# Patient Record
Sex: FEMALE | Race: WHITE | NOT HISPANIC OR LATINO | Employment: OTHER | URBAN - METROPOLITAN AREA
[De-identification: names, ages, dates, MRNs, and addresses within clinical notes are randomized per-mention and may not be internally consistent; named-entity substitution may affect disease eponyms.]

---

## 2019-10-31 PROBLEM — N81.2 UTEROVAGINAL PROLAPSE, INCOMPLETE: Status: ACTIVE | Noted: 2019-10-31

## 2019-10-31 PROBLEM — N95.0 PMB (POSTMENOPAUSAL BLEEDING): Status: ACTIVE | Noted: 2019-10-31

## 2019-10-31 PROBLEM — D28.0 BENIGN NEOPLASM OF VULVA: Status: ACTIVE | Noted: 2019-10-31

## 2019-12-30 PROBLEM — T83.711A EROSION OF IMPLANTED VAGINAL MESH AND PROSTHETIC MATERIALS: Status: ACTIVE | Noted: 2019-12-30

## 2019-12-30 PROBLEM — L92.9 ABNORMAL GRANULATION TISSUE: Status: ACTIVE | Noted: 2019-12-30

## 2020-02-20 PROBLEM — M51.36 DDD (DEGENERATIVE DISC DISEASE), LUMBAR: Status: ACTIVE | Noted: 2020-02-20

## 2020-02-20 PROBLEM — M51.369 DDD (DEGENERATIVE DISC DISEASE), LUMBAR: Status: ACTIVE | Noted: 2020-02-20

## 2024-06-18 ENCOUNTER — TELEPHONE (OUTPATIENT)
Dept: RHEUMATOLOGY | Facility: CLINIC | Age: 69
End: 2024-06-18

## 2024-06-18 NOTE — TELEPHONE ENCOUNTER
----- Message from Shanon Sheridan sent at 6/18/2024  9:23 AM CDT -----  Type:  Sooner Appointment Request    Caller is requesting a sooner appointment.  Caller declined first available appointment listed below.  Caller will not accept being placed on the waitlist and is requesting a message be sent to doctor.    Name of Caller:  pt  When is the first available appointment?  unk  Symptoms:    Would the patient rather a call back or a response via [facility]? call  Best Call Back Number:  [phone]  Additional Information:  Pt was referred to make an appt with rheumatology  Please call to advise.  She is willing to see whoever is available first

## 2024-08-21 ENCOUNTER — PATIENT MESSAGE (OUTPATIENT)
Dept: NEUROLOGY | Facility: CLINIC | Age: 69
End: 2024-08-21
Payer: COMMERCIAL

## 2024-08-21 ENCOUNTER — TELEPHONE (OUTPATIENT)
Dept: NEUROLOGY | Facility: CLINIC | Age: 69
End: 2024-08-21
Payer: COMMERCIAL

## 2024-08-21 NOTE — TELEPHONE ENCOUNTER
Spoke with the pt, referral for dysphasia (sound of pseudobulbar effect). Pt is scheduled for salivary gland bx next week, is suspected to have sjogrens although labs have been negative. The pt is scheduled with you Sept 3rd for eval, should she postpone the bx until after your evaluation, please advise.

## 2024-08-22 ENCOUNTER — OFFICE VISIT (OUTPATIENT)
Dept: NEUROLOGY | Facility: CLINIC | Age: 69
End: 2024-08-22
Payer: COMMERCIAL

## 2024-08-22 VITALS
BODY MASS INDEX: 25.79 KG/M2 | DIASTOLIC BLOOD PRESSURE: 67 MMHG | SYSTOLIC BLOOD PRESSURE: 124 MMHG | HEART RATE: 67 BPM | RESPIRATION RATE: 16 BRPM | WEIGHT: 145.63 LBS

## 2024-08-22 DIAGNOSIS — R47.1 DYSARTHRIA: Primary | ICD-10-CM

## 2024-08-22 DIAGNOSIS — R13.10 DYSPHAGIA, UNSPECIFIED TYPE: ICD-10-CM

## 2024-08-22 PROCEDURE — 99999 PR PBB SHADOW E&M-EST. PATIENT-LVL III: CPT | Mod: PBBFAC,,, | Performed by: PSYCHIATRY & NEUROLOGY

## 2024-08-22 PROCEDURE — 3044F HG A1C LEVEL LT 7.0%: CPT | Mod: CPTII,S$GLB,, | Performed by: PSYCHIATRY & NEUROLOGY

## 2024-08-22 PROCEDURE — 4010F ACE/ARB THERAPY RXD/TAKEN: CPT | Mod: CPTII,S$GLB,, | Performed by: PSYCHIATRY & NEUROLOGY

## 2024-08-22 PROCEDURE — 99205 OFFICE O/P NEW HI 60 MIN: CPT | Mod: S$GLB,,, | Performed by: PSYCHIATRY & NEUROLOGY

## 2024-08-22 PROCEDURE — 3288F FALL RISK ASSESSMENT DOCD: CPT | Mod: CPTII,S$GLB,, | Performed by: PSYCHIATRY & NEUROLOGY

## 2024-08-22 PROCEDURE — 3008F BODY MASS INDEX DOCD: CPT | Mod: CPTII,S$GLB,, | Performed by: PSYCHIATRY & NEUROLOGY

## 2024-08-22 PROCEDURE — 3078F DIAST BP <80 MM HG: CPT | Mod: CPTII,S$GLB,, | Performed by: PSYCHIATRY & NEUROLOGY

## 2024-08-22 PROCEDURE — 1100F PTFALLS ASSESS-DOCD GE2>/YR: CPT | Mod: CPTII,S$GLB,, | Performed by: PSYCHIATRY & NEUROLOGY

## 2024-08-22 PROCEDURE — 3074F SYST BP LT 130 MM HG: CPT | Mod: CPTII,S$GLB,, | Performed by: PSYCHIATRY & NEUROLOGY

## 2024-08-22 PROCEDURE — G2211 COMPLEX E/M VISIT ADD ON: HCPCS | Mod: S$GLB,,, | Performed by: PSYCHIATRY & NEUROLOGY

## 2024-08-22 NOTE — PATIENT INSTRUCTIONS
Agree with cancelling the biopsy.  I agree with rheumatology that your symptoms are not consistent with dry mouth or Sjogren's Syndrome.    Will get labs today as a preliminary screen to start looking at muscle conditions.    Will get a follow up swallow evaluation.    Will request a copy of your EMG

## 2024-08-22 NOTE — PROGRESS NOTES
NEUROLOGY  Outpatient CONSULT  [facility]  [address] [facility] [address], [place] [address]  [phone] (office) / [phone] (fax)    Patient Name:  Nicci Shannon  :  1955  MR #:  [id number]  Acct #:  [id number]    Date of Neurology Consult: 2024  Name of Neurologist: Lena Virk D.O, ABPN, AOBNP, ABEM    Other Physicians:  Jasper Moreno MD (Primary Care Physician); Self, Kasey (Referring)      Chief Complaint: dysarthria      History of Present Illness (HPI):  Nicci Shannon is a 69 y.o. female referred for consultation regarding speech and swallowing changes.    She noticed she lost in her singing voice last .  She had to quit the choir in December because of this.  She had no vocal control when singing, wobbling of the voice.  She had no shortness of breath when singing.  Then she started to notice her tongue was becoming lazy and the quality of her speech changed.  She started speech therapy around 2024, but she isn't sure it has helped though she seemed to stabilize.  After being off ST for a couple months she wants to go back.    Slowly she started to notice issues with swallowing.  This seemed to get progressively worse as well.  She has to eat a mechanical diet, otherwise she feels things will get stuck.  She will get choked up sipping on water.  She is living on soup.  She has to be careful, even this can cause her to cough or have to clear her throat.  She sometimes feels it hits the back of her throat before she is ready for it.    She feels that her cough is pretty strong.      She had been following with ENT a few months prior to this and she picked up on the speech changes.  She ordered a neurology referral and speech therapy referral. She was seen by neurology at [facility].  They did an EMG and some blood work for Sjogren's.  She also had an MRI of her neck.  She was seen by rheumatology at [facility]. They did more blood work.  Overall  her workup thus far was fairly unremarkable.  Rheumatology suggested back to neurology, but was willing to do one other test.  She was referred back to ENT for a bx of the salivary gland.  She is scheduled for this next week at [facility].      She has no significant dry eye symptoms but does use eye drops here and there. She never has burning or excessive tearing.  She states she has excessive saliva at night, even during the day she has to wipe her saliva.  She also feels her left face droops some.  She feels her saliva is thick at times, but she does not feel like she has dry mouth.     She denies any weight loss.  She has not had shortness of breath.  She had a bad fall last year, but no falls lately. She has stumbled a few times, she attributes to not picking up her feet or feeling a little disequilibrium.  This is chronic, but seems more evident now.  She has no double vision or drooping eyelids.    There is no fluctuation in her symptoms.      She has no family history of any neurological conditions.     No preceding illness or infection.       Treatment to date:    N/A    Review of Systems:   General: Weight gain: No, Weight Loss: No, Fatigue: Yes,   Fever: No, Chills: No, Night Sweats: No, Insomnia: No, Excessive sleeping: No   Respiratory:  Cough: No, Shortness of Breath: No,   Wheezing: No, Excessive Snoring: Yes, Coughing up blood: No  Endocrine: Heat Intolerance: No, Cold Intolerance: No,   Excessive Thirst: No, Excessive Hunger: No,   Eyes:  Blurred Vision: No, Double Vision: No,   Light Sensitivity: No, Eye pain: No  Musculoskeletal: Muscle Aches/Pain: Yes, Joint Pain/Swelling: No, Muscle Cramps: No, Muscle Weakness: No, Neck Pain: Yes, Back Pain: Yes   Neurological: Difficulty Walking/Falls: No, Headache Migraine: Yes, Dizziness/Vertigo: Yes, Fainting: No, Difficulty with Speech: Yes, Weakness: No, Tingling/Numbness: No, Tremors: No, Memory Problems: Yes, Seizures: No, Difficulty Swallowing: Yes,  Altered Taste: No.  Cardiovascular: Chest Pain: No, Shortness of Breath: No,   Palpitations: No,  Gastrointestinal: Nausea/Vomiting: No, Constipation: No, Diarrhea: No, Bloody Stools: No   Psych/Cog:  Depression: No, Anxiety: No, Hallucinations: No, Problems Concentrating: Yes  : Frequent Urination: No, Incontinence: No, Urinary Infections: No, Blood of Urine: No,   ENT:Hearing Loss: Yes, Earache: No, Ringing in Ears: Yes,   Facial Pain: No, Chronic Congestion: No   Immune: Seasonal Allergies: No, Hives and/or Rashes: No  The remainder of the review of twelve body systems was reviewed and normal.    Past Medical, Surgical, Family & Social History:   Past Medical History:   Diagnosis Date    Bursitis     right hip    DDD (degenerative disc disease), cervical     DDD (degenerative disc disease), lumbar     Depression     General anesthetics causing adverse effect in therapeutic use     Hearing loss     Hypertension     Hypothyroidism     ROBERTO (obstructive sleep apnea)     does not use cpap    Shoulder pain     left    Thyroid disease      Past Surgical History:   Procedure Laterality Date    ABDOMINAL SACROCOLPOPEXY N/A 10/31/2019    Procedure: SACROCOLPOPEXY, ABDOMINAL;  Surgeon: Reagan Maldonado MD;  Location: [facility] OR;  Service: OB/GYN;  Laterality: N/A;    CERVICAL FUSION      CYSTOSCOPY N/A 10/31/2019    Procedure: CYSTOSCOPY;  Surgeon: Reagan Maldonado MD;  Location: [facility] OR;  Service: OB/GYN;  Laterality: N/A;    PARTIAL VULVECTOMY Bilateral 10/31/2019    Procedure: VULVECTOMY, PARTIAL - Labial Mass Resection;  Surgeon: Reagan Maldonado MD;  Location: [facility] OR;  Service: OB/GYN;  Laterality: Bilateral;    REVISION OF VAGINAL CUFF N/A 12/30/2019    Procedure: REVISION, VAGINAL CUFF;  Surgeon: Reagan Maldonado MD;  Location: [facility] OR;  Service: OB/GYN;  Laterality: N/A;    SURGICAL PROCEDURE FOR STRESS INCONTINENCE USING TENSION FREE VAGINAL TAPE N/A 10/31/2019    Procedure: SURGICAL PROCEDURE, USING  TENSION FREE VAGINAL TAPE, FOR STRESS INCONTINENCE;  Surgeon: Reagan Maldonado MD;  Location: [facility] OR;  Service: OB/GYN;  Laterality: N/A;    TONSILLECTOMY      TOTAL ABDOMINAL HYSTERECTOMY W/ BILATERAL SALPINGOOPHORECTOMY Bilateral 10/31/2019    Procedure: HYSTERECTOMY, TOTAL, ABDOMINAL, WITH BILATERAL SALPINGO-OOPHORECTOMY;  Surgeon: Reagan Maldonado MD;  Location: [facility] OR;  Service: OB/GYN;  Laterality: Bilateral;    VAGINAL DELIVERY      x2    VEIN STRIPPING        Family History   Problem Relation Name Age of Onset    Heart disease Mother      Stroke Father       Alcohol use:  reports that she does not currently use alcohol.   (Of note, 0.6 oz = 1 beer or 6 oz = 10 beers).  Tobacco use:  reports that she quit smoking about 48 years ago. Her smoking use included cigarettes. She started smoking about 52 years ago. She has a 2 pack-year smoking history. She has never used smokeless tobacco.  Street drug use:  reports no history of drug use.  Allergies: Patient has no known allergies..    Home Medications:     Current Outpatient Medications:     aspirin (ECOTRIN) 81 MG EC tablet, Take 81 mg by mouth once daily., Disp: , Rfl:     cholecalciferol, vitamin D3, 1,250 mcg (50,000 unit) capsule, Take 50,000 Units by mouth twice a week., Disp: , Rfl:     estradioL (ESTRACE) 2 MG tablet, Take 2 mg by mouth once daily., Disp: , Rfl:     FLUoxetine 20 MG capsule, Take 1 capsule (20 mg total) by mouth once daily., Disp: 30 capsule, Rfl: 1    gabapentin (NEURONTIN) 300 MG capsule, Take 1 capsule by mouth nightly., Disp: , Rfl:     losartan (COZAAR) 25 MG tablet, Take 1 tablet (25 mg total) by mouth every evening., Disp: 90 tablet, Rfl: 3    pantoprazole (PROTONIX) 40 MG tablet, Take 1 tablet (40 mg total) by mouth once daily., Disp: 30 tablet, Rfl: 11    pravastatin (PRAVACHOL) 40 MG tablet, Take 1 tablet (40 mg total) by mouth once daily., Disp: 90 tablet, Rfl: 3    Physical Examination:  /67 (BP Location: Left  arm, Patient Position: Sitting, BP Method: Medium (Manual))   Pulse 67   Resp 16   Wt 66 kg (145 lb 9.8 oz)   BMI 25.79 kg/m²     GENERAL:  General appearance: Well, non-toxic appearing.  No apparent distress.  Extremities: normal.    MENTAL STATUS:  Alertness, attention span & concentration: normal.  Language: normal.  Orientation to self, place & time:  normal.  Memory, recent & remote: normal.  Fund of knowledge: normal.    SPEECH:  Dysarthric and fluent.  Follows complex commands.    CRANIAL NERVES:  Cranial Nerves II-XII were examined.  II - Visual fields: normal.  III, IV, VI: PERRL, EOMI, No ptosis, No nystagmus.  V - Facial sensation: normal.  VII - Face symmetry & mobility: normal.  VIII - Hearing: normal.  IX, X - Palate: mobile & midline.  XI - Shoulder shrug: normal.  XII - Tongue protrusion: normal.    GROSS MOTOR:  Gait & station: normal.  Tone: normal.  Abnormal movements: none.  Finger-nose & Heel-knee-shin: normal.  Rapid alternating movements & drift: normal.    MUSCLE STRENGTH:     Fascics Atrophy RIGHT    LEFT Atrophy Fascics     5 Neck Ext. 5       5 Neck Flex 5       4 Deltoids 4       5 Sh.Ext.Rot. 5       5 Sh.Int.Rot. 5       5 Biceps 5       5 Triceps 5       5 Forearm.Pr. 5       5 Wrist Ext. 5       5 Wrist Flex 5       5 Finger Ext. 5       5 Finger Flex 5       5 FPL 5       5 Inteross. 5                         4 Iliopsoas 4       5 Hip Abduct 5       5 Hip Adduct 5       5 Quads 5       5 Hams 5       5 Dorsiflex 5       5 Plantar Flex 5       5 Ankle Elkin 5       5 Ankle Invert 5       5 Toe Ext. 5       5 Toe Flex 5                         REFLEXES:    RIGHT Reflex   LEFT   3 Biceps 3   3 Brachiorad. 3   3 Triceps 3   - Pectoralis -   - Jaw Jerk -   - Mtz's -        2+ Patellar 2+   2+ Ankle 2+   - Suprapatellar -              PLANTAR        SENSORY:  Light touch: Normal throughout.  Sharp touch: Normal throughout.  Vibration: Normal throughout.      Diagnostic Data  Reviewed:   Laryngoscopy 11/1/23 with Dr. Lewis at [facility]  Laryngoscopy flexible fiberoptic (NPL exam)  Following the topical application of nasal decongestant and anesthetic spray to the nasal cavity, flexible scope was passed via the nasal cavity.  Findings:   NP: adenoids surgically absent  Hypopharynx: normal  BOT: normal  Larynx:   Epiglottis - normal  Aryepiglottic folds - normal  Arytenoids - normal  Interarytenoid area - normal  False vocal cords - normal  True vocal cords - normal  Patient tolerated the procedure well without difficulty    Component      Latest Ref Rng 8/1/2024   WBC      3.90 - 12.70 K/uL 4.90    RBC      4.00 - 5.40 M/uL 4.12    Hemoglobin      12.0 - 16.0 g/dL 11.7 (L)    Hematocrit      37.0 - 48.5 % 36.3 (L)    MCV      82 - 98 fL 88    MCH      27.0 - 31.0 pg 28.4    MCHC      32.0 - 36.0 g/dL 32.2    RDW      11.5 - 14.5 % 12.7    Platelet Count      150 - 450 K/uL 338    MPV      9.2 - 12.9 fL 10.3    Immature Granulocytes      0.0 - 0.5 % 0.4    Gran # (ANC)      1.8 - 7.7 K/uL 2.1    Immature Grans (Abs)      0.00 - 0.04 K/uL 0.02    Lymph #      1.0 - 4.8 K/uL 2.1    Mono #      0.3 - 1.0 K/uL 0.4    Eos #      0.0 - 0.5 K/uL 0.2    Baso #      0.00 - 0.20 K/uL 0.05    nRBC      0 /100 WBC 0    Gran %      38.0 - 73.0 % 43.2    Lymph %      18.0 - 48.0 % 42.7    Mono %      4.0 - 15.0 % 8.4    Eos %      0.0 - 8.0 % 4.3    Basophil %      0.0 - 1.9 % 1.0    Differential Method Automated    Sodium      136 - 145 mmol/L 139    Potassium      3.5 - 5.1 mmol/L 4.3    Chloride      95 - 110 mmol/L 107    CO2      22 - 31 mmol/L 26    Glucose      70 - 110 mg/dL 91    BUN      7 - 18 mg/dL 13    Creatinine      0.50 - 1.40 mg/dL 0.86    Calcium      8.4 - 10.2 mg/dL 8.8    PROTEIN TOTAL      6.0 - 8.4 g/dL 6.5    Albumin      3.5 - 5.2 g/dL 3.7    BILIRUBIN TOTAL      0.2 - 1.3 mg/dL 0.6    ALP      38 - 145 U/L 61    AST      14 - 36 U/L 24    ALT      0 - 35 U/L 16    Anion  Gap      5 - 12 mmol/L 6    eGFR      >60 mL/min/1.73 m^2 >60    SSA Antibody      0 - 40 AU/mL 4    SSA 60 (Ro) SARINA Antibody      0 - 40 AU/mL 1    Thyroperoxidase Antibodies      0.0 - 9.0 IU/mL 0.5    Thyroglobulin Ab Screen      0.0 - 4.0 IU/mL 4.5 (H)    Anti-Centromere Antibody      0 - 40 AU/mL 0    SCL-70 Antibody      0 - 40 AU/mL 1    TSH      0.400 - 4.000 uIU/mL 2.420    Free T4      0.78 - 2.19 ng/dL 1.00    SSB Antibody      0 - 40 AU/mL 0    IgG 4      1 - 123 mg/dL 73    Hep B S Ab      IU/L <3.10    T-SPOT TB Screening Test See result image under hyperlink    HCV Ab Screen w/Reflex Negative    Hepatitis B Core Ab Total      Negative  Negative       Component      Latest Ref Rng 3/27/2024 6/7/2024   SSA Antibody      0 - 40 AU/mL  3    SSA 60 (Ro) SARINA Antibody      0 - 40 AU/mL  1    AChR Blocking Abs, Serum      0 - 26 % 15     AChR Binding Ab, Serum      0.0 - 0.4 nmol/L 0.0     MuSK Antibody Test      <1:10  <1:10     LEANNA Screen      None Detected   None Detected    CRP      0.00 - 0.90 mg/dL  <0.50    Sed Rate      0 - 29 mm/Hr  3        MBSS 11/15/23 at [facility]   1.  Laryngeal penetration observed with intermediate liquid consistency only.    2.  No fluoroscopic evidence of aspiration with any presented consistency.   3. Barium tablet passes through pharynx and cervical esophagus without delay.   4. Refer to speech pathology note for additional detail and nutritional recommendation.     Esophagram 11/17/23 at [facility]  1.  Esophageal dysmotility.    2.  Small hiatal hernia.   3. Moderate gastroesophageal reflux disease.         Assessment and Plan:  Nicci Shannon is a 69 y.o. right handed woman with progressive dysarthria and dysphagia, mild proximal muscle weakness and brisk reflexes.  The bulk of her symptoms raise concern for progressive bulbar palsy, but the reflex changes in the upper limbs could be indicative of a more diffuse type of motor neuron disease such as ALS.  We reviewed these concerns  today and have discussed testing.  They understand there is no testing for motor neuron disease, it is a diagnosis of exclusion.  There are however criteria that must be met to be more confident of the diagnosis.  At this point her physical exam does not meet that physical exam criteria.    As per her rheumatologist suspicion I do not believe this is related to Sjogren's disease at all.  I do not feel she will require a biopsy to make that diagnosis.    We will get labs today as a preliminary screen looking at muscle conditions.  She will get a follow-up swallow evaluation, her swallow study last year was normal.  It is clear she is developing some issues related to swallowing and may need some advised him that.  Going to request a copy of her previous EMG however suspect we will be repeating this.  She is in agreement with this plan I will see her back shortly after testing is completed to review the results.    Information on patient AVS:  Agree with cancelling the biopsy.  I agree with rheumatology that your symptoms are not consistent with dry mouth or Sjogren's Syndrome.  Will get labs today as a preliminary screen to start looking at muscle conditions.    Will get a follow up swallow evaluation.  Will request a copy of your EMG       Visit today is associated with current or anticipated ongoing medical care related to this patient's single serious condition/complex condition (dysarthria/dysphagia). Plan to followup in 1 months for ongoing management.     Important to note, also  has a past medical history of Bursitis, DDD (degenerative disc disease), cervical, DDD (degenerative disc disease), lumbar, Depression, General anesthetics causing adverse effect in therapeutic use, Hearing loss, Hypertension, Hypothyroidism, ROBERTO (obstructive sleep apnea), Shoulder pain, and Thyroid disease.    Time Spent: I spent a total of 74 minutes on the day of the visit.This includes face to face time and non-face to face time  preparing to see the patient (eg, review of tests), Obtaining and/or reviewing separately obtained history, Documenting clinical information in the electronic or other health record, Independently interpreting resultsand communicating results to the patient/family/caregiver, or Care coordination.        Lena Virk D.O, ABPN, AOBNP, ABEM

## 2024-08-26 ENCOUNTER — LAB VISIT (OUTPATIENT)
Dept: LAB | Facility: HOSPITAL | Age: 69
End: 2024-08-26
Attending: PSYCHIATRY & NEUROLOGY
Payer: COMMERCIAL

## 2024-08-26 DIAGNOSIS — R47.1 DYSARTHRIA: ICD-10-CM

## 2024-08-26 DIAGNOSIS — R13.10 DYSPHAGIA, UNSPECIFIED TYPE: ICD-10-CM

## 2024-08-26 PROCEDURE — 36415 COLL VENOUS BLD VENIPUNCTURE: CPT | Mod: PO | Performed by: PSYCHIATRY & NEUROLOGY

## 2024-08-26 PROCEDURE — 0361U NEURFLMNT LT CHN DIG IA QUAN: CPT | Performed by: PSYCHIATRY & NEUROLOGY

## 2024-08-26 PROCEDURE — 83516 IMMUNOASSAY NONANTIBODY: CPT | Performed by: PSYCHIATRY & NEUROLOGY

## 2024-08-29 ENCOUNTER — TELEPHONE (OUTPATIENT)
Dept: NEUROLOGY | Facility: CLINIC | Age: 69
End: 2024-08-29

## 2024-08-29 ENCOUNTER — PROCEDURE VISIT (OUTPATIENT)
Dept: NEUROLOGY | Facility: CLINIC | Age: 69
End: 2024-08-29
Payer: COMMERCIAL

## 2024-08-29 DIAGNOSIS — R13.10 DYSPHAGIA, UNSPECIFIED TYPE: ICD-10-CM

## 2024-08-29 DIAGNOSIS — G12.21 ALS (AMYOTROPHIC LATERAL SCLEROSIS): Primary | ICD-10-CM

## 2024-08-29 DIAGNOSIS — R47.1 DYSARTHRIA: ICD-10-CM

## 2024-08-29 LAB — NEUROFILAMENT LIGHT CHAIN, PLASMA: 141 PG/ML

## 2024-08-29 PROCEDURE — 95886 MUSC TEST DONE W/N TEST COMP: CPT | Mod: S$GLB,,, | Performed by: PSYCHIATRY & NEUROLOGY

## 2024-08-29 PROCEDURE — 95910 NRV CNDJ TEST 7-8 STUDIES: CPT | Mod: S$GLB,,, | Performed by: PSYCHIATRY & NEUROLOGY

## 2024-08-29 PROCEDURE — 95887 MUSC TST DONE W/N TST NONEXT: CPT | Mod: S$GLB,,, | Performed by: PSYCHIATRY & NEUROLOGY

## 2024-08-29 NOTE — TELEPHONE ENCOUNTER
Left message with Dr Phillips's office asking that they fax the most recent EKG to our office. Provided our contact info if they need to reach out with any questions.

## 2024-08-29 NOTE — PATIENT INSTRUCTIONS
The EMG shows signs that the changes in your speech and swallowing are nerve related.  Right now the EMG did not find enough of a pattern related to nerve changes to be able to confirm any type of diagnosis, it simply increases suspicion.  One of the problems that it is concerning for is ALS, a type of motor neuron disease.  Progressive Bulbar Palsy is another possibility.    While we continue to better define what is happening, I would like to start you on medications we use to slow progression of ALS.  These won't harm you if the diagnosis is wrong and it won't harm you to stop them down the road if needed.  But they will be most beneficial when started early on.    Tiglutek is a medicine designed to slow progression of weakness in ALS.  It is a liquid form of the more commonly used Rilutek.  Both are the generic riluzole.  Tiglutek dosing is 50mg (10ml) twice a day.  This will come from a specialty pharmacy.  They will reach out to you to make arrangements.  Nuedexta was designed for pseudobulbar affect (laughing and crying that is not appropriate or easy to control).  This has also been found to slow the progression of speech and swallow weakness in patients with ALS.  This is 1 pill a day for 7 days, then 1 pill twice a day.  The only caveat to this is that you have to have a normal heart rhythm timing.  If you have something called prolonged QT syndrome, this medicine can be problematic.  Will request your EKG from Dr. Phillips.   If they have not done one, we will request one.  We should be able to order Nuedexta from your local pharmacy once the EKG is cleared.

## 2024-09-03 RX ORDER — RILUZOLE 50 MG/10ML
10 LIQUID ORAL 2 TIMES DAILY
Qty: 600 ML | Refills: 11 | Status: SHIPPED | OUTPATIENT
Start: 2024-09-03 | End: 2025-09-03

## 2024-09-05 ENCOUNTER — TELEPHONE (OUTPATIENT)
Dept: NEUROLOGY | Facility: CLINIC | Age: 69
End: 2024-09-05
Payer: COMMERCIAL

## 2024-09-05 NOTE — PROGRESS NOTES
Outside EMG received.    Performed at [facility] by Dr. Villalpando on 6/5/24 in both upper extremities.  Reported as normal rep stim of the trapezius and ulnar muscles.  No facial muscles were tested.  Reported as bilateral carpal tunnel syndrome.

## 2024-09-05 NOTE — TELEPHONE ENCOUNTER
----- Message from Ewa Sheffield sent at 9/5/2024  9:27 AM CDT -----  Contact: Rebecca moseley [facility]  Type:  Pharmacy Calling to Clarify an RX    Name of Caller:  Rebecca  Pharmacy Name:  [facility]  Prescription Name:  riluzole (TIGLUTIK) 50 mg/10 mL Susp  What do they need to clarify?:  Stated needs PA to process  Best Call Back Number: [phone]   Additional Information:  Can we please call back to advise. Thank You

## 2024-09-06 LAB — ANTI-IBM ANTIBODY (ANTI-CN1A): <20 UNITS

## 2024-09-09 ENCOUNTER — TELEPHONE (OUTPATIENT)
Dept: NEUROLOGY | Facility: CLINIC | Age: 69
End: 2024-09-09
Payer: COMMERCIAL

## 2024-09-09 NOTE — TELEPHONE ENCOUNTER
----- Message from Sana Daniel sent at 9/9/2024  2:30 PM CDT -----  Regarding: RX Refill Request/prior authorization denied-appeal information  Contact: [facility] at [phone]  Type:  RX Refill Request/prior authorization denied-appeal information    Who Called:    [id number] - [place], [place] - [address]  [address]  [address]  [facility] [address]  Phone: [phone] Fax: [phone]      Additional Information:  Appeal information at tele: [phone]. Please call and advise. Thank you    riluzole (TIGLUTIK) 50 mg/10 mL Susp 600 mL 11 9/3/2024 9/3/2025   Sig - Route: Take 10 mLs by mouth 2 (two) times a day. - Oral   Sent to pharmacy as: riluzole (TIGLUTIK) 50 mg/10 mL Susp   E-Prescribing Status: Receipt confirmed by pharmacy (9/3/2024  4:19 PM CDT)   Prior authorization: Closed - Other

## 2024-09-09 NOTE — TELEPHONE ENCOUNTER
Spoke with [facility], provided a verbal appeal to for both Jd and Tracy. Approval pending review but promising. They will reach out if further information is required.

## 2024-09-10 ENCOUNTER — OFFICE VISIT (OUTPATIENT)
Dept: NEUROLOGY | Facility: CLINIC | Age: 69
End: 2024-09-10
Payer: COMMERCIAL

## 2024-09-10 VITALS
HEART RATE: 65 BPM | DIASTOLIC BLOOD PRESSURE: 57 MMHG | RESPIRATION RATE: 18 BRPM | SYSTOLIC BLOOD PRESSURE: 117 MMHG | HEIGHT: 65 IN | BODY MASS INDEX: 23.73 KG/M2 | WEIGHT: 142.44 LBS

## 2024-09-10 DIAGNOSIS — R47.1 DYSARTHRIA: ICD-10-CM

## 2024-09-10 DIAGNOSIS — R13.10 DYSPHAGIA, UNSPECIFIED TYPE: ICD-10-CM

## 2024-09-10 DIAGNOSIS — F48.2 PBA (PSEUDOBULBAR AFFECT): ICD-10-CM

## 2024-09-10 DIAGNOSIS — G12.21 ALS (AMYOTROPHIC LATERAL SCLEROSIS): Primary | ICD-10-CM

## 2024-09-10 PROCEDURE — G2211 COMPLEX E/M VISIT ADD ON: HCPCS | Mod: S$PBB,,, | Performed by: PSYCHIATRY & NEUROLOGY

## 2024-09-10 PROCEDURE — 99215 OFFICE O/P EST HI 40 MIN: CPT | Mod: S$PBB,,, | Performed by: PSYCHIATRY & NEUROLOGY

## 2024-09-10 PROCEDURE — 99999 PR PBB SHADOW E&M-EST. PATIENT-LVL III: CPT | Mod: PBBFAC,,, | Performed by: PSYCHIATRY & NEUROLOGY

## 2024-09-12 ENCOUNTER — PATIENT MESSAGE (OUTPATIENT)
Dept: NEUROLOGY | Facility: CLINIC | Age: 69
End: 2024-09-12
Payer: COMMERCIAL

## 2024-09-13 ENCOUNTER — TELEPHONE (OUTPATIENT)
Dept: NEUROLOGY | Facility: CLINIC | Age: 69
End: 2024-09-13
Payer: COMMERCIAL

## 2024-09-13 NOTE — TELEPHONE ENCOUNTER
Spoke with the pt, she reports she received bit nudexta and radicava. Reports the cost of nudexta at her local pharmacy was $160.00 for quantity of 60. I spoke with the pharmacist, requested they run the entire #180 through insurance to see if the cost would be any different. He reports there was an error when filling the medication and she can  the rest of the prescription Monday, cost pt. $0.  I also, reached out to [facility] for price and possible rx assistance, waiting on a response. I advised the pt to  the remaining nudexta that is owed to her from her local pharmacy on Monday. Pt v/u, swallow study scheduled for Monday as well.

## 2024-09-13 NOTE — TELEPHONE ENCOUNTER
Spoke with the pt today, she reports that she is have difficulty managing increased saliva production at night. Please advise.

## 2024-09-18 DIAGNOSIS — G12.21 ALS (AMYOTROPHIC LATERAL SCLEROSIS): Primary | ICD-10-CM

## 2024-09-19 ENCOUNTER — TELEPHONE (OUTPATIENT)
Dept: NEUROLOGY | Facility: CLINIC | Age: 69
End: 2024-09-19
Payer: COMMERCIAL

## 2024-09-19 NOTE — TELEPHONE ENCOUNTER
Teglutik approved from 1/1/24 to 9/9/25.  Nuedexta approved from 1/1/24 to 9/9/24. Both per silver script.  Approval sent to scanning.

## 2024-09-23 ENCOUNTER — PATIENT MESSAGE (OUTPATIENT)
Dept: NEUROLOGY | Facility: CLINIC | Age: 69
End: 2024-09-23
Payer: COMMERCIAL

## 2024-09-23 DIAGNOSIS — Z82.79 FAMILY HISTORY OF VON HIPPEL-LINDAU SYNDROME: ICD-10-CM

## 2024-09-23 DIAGNOSIS — R47.1 DYSARTHRIA: Primary | ICD-10-CM

## 2024-09-23 DIAGNOSIS — R13.10 DYSPHAGIA, UNSPECIFIED TYPE: ICD-10-CM

## 2024-09-23 DIAGNOSIS — Z82.79 FAMILY HISTORY OF VON HIPPEL-LINDAU SYNDROME: Primary | ICD-10-CM

## 2024-09-23 NOTE — PROCEDURES
[facility] [place] [facility]  [address] [facility] [address]  [place] [address]  [phone]      Full Name: Toni Shannon Gender: Female  Patient ID: [id number] YOB: 1955      Visit Date: 8/29/2024 2:16 PM  Age: 69 Years  Examining Physician: Lena Virk D.O., JARAD, CHIP, SAMANTHA   Referring Physician: Lena Virk D.O., JARAD, CHIP, SAMANTHA  Technologist: AMELIA Owens   Height: 5 feet 5 inch  History: Patient notes difficulty with speech and swallowing that is progressively worsening.  She has been referred for an EMG of the right upper and lower extremities for motor neuron disease evaluation.      Sensory NCS      Nerve / Sites Rec. Site Onset Lat Peak Lat NP Amp Segments Distance Velocity Temp.     ms ms µV  cm m/s °C   R Median - Digit II (Antidromic)      Wrist Dig II 2.58 3.79 32.1 Wrist - Dig II 13 50 34.5      Ref.   <=3.80 >=10.0 Ref.  >=50    R Ulnar - Digit V (Antidromic)      Wrist Dig V 2.15 3.17 40.5 Wrist - Dig V 11 51 34.5      Ref.   <=3.20 >=10.0 Ref.  >=50    R Sural - Ankle (Calf)      Calf Ankle 2.65 3.46 19.2 Calf - Ankle 14 53 32.4      Ref.   <=4.60 >=3.0 Ref.  >=40    R Superficial peroneal - Ankle      Lat leg Ankle 2.46 3.42 14.7 Lat leg - Ankle 12 49 32.4      Ref.   <=4.60 >=3.0 Ref.          Motor NCS      Nerve / Sites Muscle Latency Ref. Amplitude Ref. Amp % Duration Segments Distance Lat Diff Ref. Velocity Ref. Temp.     ms ms mV mV % ms  cm ms ms m/s m/s °C   R Median - APB      Wrist APB 3.65 <=4.00 5.4 >=5.0 100 5.73 Wrist - APB      34.5      Elbow APB 7.85  4.9  90.2 6.98 Elbow - Wrist 22 4.21  52 >=50 34.5   R Ulnar - ADM      Wrist ADM 2.98 <=3.10 9.9 >=7.0 100 8.29 Wrist - ADM      34.5      B.Elbow ADM 6.79  9.5  95.3 8.73 B.Elbow - Wrist 19.5 3.81  51 >=50 34.5      A.Elbow ADM 8.52  9.0  90.4 8.56 A.Elbow - B.Elbow 10.5 1.73  61  34.5   R Peroneal - EDB      Ankle EDB 4.06 <=6.00 2.7 >=2.5 100 6.17 Ankle - EDB       32.4      Fib head EDB 10.46  2.4  88.8 6.96 Fib head - Ankle 30 6.40  47 >=40 32.4      Pop fossa EDB 12.46  2.2  83.8 6.73 Pop fossa - Fib head 10 2.00  50  32.4   R Tibial - AH      Ankle AH 5.75 <=6.00 5.0 >=4.0 100 3.06 Ankle - AH      32.4      Pop fossa AH 13.33  4.0  80.8 5.79 Pop fossa - Ankle 38 7.58  50 >=40 32.4       F  Wave      Nerve Fmin Ref.    ms ms   R Peroneal - EDB 47.29 <=56.00   R Tibial - AH 49.32 <=56.00   R Median - APB 28.28 <=31.00   R Ulnar - ADM 29.38 <=32.00       EMG Summary Table     Spontaneous Recruitment Activation Duration Amplitude Polyphasia Comment   Muscle Ins Act Fib Fasc Pattern - - - - -   R. First dorsal interosseous Normal 0 0 Normal Normal Normal Normal Normal Normal   R. Abductor pollicis brevis Inc 0 Occ Sl Dec Normal +2 +2 N/+1 Normal   R. Pronator teres Normal 0 0 Normal Normal Normal Normal Normal Normal   R. Biceps brachii Normal 0 0 Normal Normal Normal Normal Normal Normal   R. Triceps brachii Normal 0 0 Normal Normal Normal Normal Normal Normal   R. Deltoid Inc Rare Rare Normal Normal Normal Normal Normal Normal   R. Cervical paraspinals Normal 0 0      Normal   R. Tongue Inc Rare 0 Sl Dec Normal Normal Normal Normal Normal   R. Tibialis anterior Normal 0 0 Normal Normal Normal Normal Normal Normal   R. Gastrocnemius (Medial head) Normal 0 0 Normal Normal Normal Normal Normal Normal   R. Extensor hallucis longus Normal 0 0 Normal Normal Normal Normal Normal Normal   R. Vastus medialis Normal 0 0 Sl Dec Normal +1 +1 N/+1 Normal   R. Tensor fasciae latae Normal 0 0 Normal Normal Normal Normal Normal Normal   R. Biceps femoris (short head) Normal 0 0 Normal Normal Normal Normal Normal Normal   R. Iliopsoas Normal 0 0 Normal Normal Normal Normal Normal Normal   R. Thoracic paraspinals Normal 0 0      Normal   R. Lumbar paraspinals Normal 0 0      Normal         [place] [id number] 8/29/2024 2:16 PM     4 of 4    Summary:  Nerve conduction studies were  performed on the right upper and lower extremities.  Right median, ulnar, sural and superficial peroneal sensory responses were normal in amplitude and latencies.  Right median, ulnar, peroneal and tibial motor responses were normal in amplitude, latencies and velocity.  Right median, ulnar, peroneal and tibial minimal F-wave latencies were normal.  Needle EMG was performed in the right upper and lower extremities as well as the tongue and paraspinal muscles.  Active denervation was noted in the right abductor pollicis brevis, deltoid and tongue muscles.  Motor units were large, long and poly phasic with reduced recruitment in the right abductor pollicis brevis muscle.  Decreased recruitment was noted in the tongue muscles.  Motor units were large, long and poly phasic with reduced recruitment in the right vastus medialis muscle.  All other motor unit morphology and recruitment patterns were normal.    Impression:  This is an abnormal EMG of the right upper and lower extremities with additional studies of the thoracic paraspinal muscles and tongue.  This test reveals slight active denervation in two regions of the nervous system - cranial and cervical.   There are rare findings of neurogenic motor units on this study.  Overall, this study does not meet electrodiagnostic criteria for motor neuron disease, but does raise suspicion.  A repeat study in 3-6 months would be reasonable if the diagnosis remains in question.  There area changes in the right abductor pollicis brevis muscle that can be seen with C8/T1 radiculopathy or carpal tunnel syndrome.  Clinical correlation is advised.  There are changes in the right deltoid muscle that can be seen with C5/6 radiculopathy.  Clinical correlation is advised.  There are changes in the right vastus medialis muscle that can be seen with L4 radiculopathy, clinical correlation is advised.  There are changes in the tongue muscle that can be seen with hypoglossal neuropathy,  clinical correlation advised. There is no definitive evidence of any other focal neuropathy, plexopathy, radiculopathy or myopathy on this study.  There is no peripheral neuropathy on this study.    Thank you for referring to the [facility] in [place]. Please feel free to contact the clinic if you have any further questions regarding this study or report.       _____________________________  Lena Virk D.O., ABPN, AOBNP, SAMANTHA [place] [id number] 8/29/2024 2:16 PM     4 of 4

## 2024-09-26 ENCOUNTER — LAB VISIT (OUTPATIENT)
Dept: LAB | Facility: HOSPITAL | Age: 69
End: 2024-09-26
Attending: PSYCHIATRY & NEUROLOGY
Payer: MEDICARE

## 2024-09-26 DIAGNOSIS — R13.10 DYSPHAGIA, UNSPECIFIED TYPE: ICD-10-CM

## 2024-09-26 DIAGNOSIS — Z82.79 FAMILY HISTORY OF VON HIPPEL-LINDAU SYNDROME: ICD-10-CM

## 2024-09-26 DIAGNOSIS — R47.1 DYSARTHRIA: ICD-10-CM

## 2024-09-26 PROCEDURE — 81404 MOPATH PROCEDURE LEVEL 5: CPT | Performed by: PSYCHIATRY & NEUROLOGY

## 2024-09-26 PROCEDURE — 36415 COLL VENOUS BLD VENIPUNCTURE: CPT | Mod: PO | Performed by: PSYCHIATRY & NEUROLOGY

## 2024-09-30 ENCOUNTER — PATIENT MESSAGE (OUTPATIENT)
Dept: NEUROLOGY | Facility: CLINIC | Age: 69
End: 2024-09-30
Payer: MEDICARE

## 2024-10-16 ENCOUNTER — PATIENT MESSAGE (OUTPATIENT)
Dept: NEUROLOGY | Facility: CLINIC | Age: 69
End: 2024-10-16
Payer: MEDICARE

## 2024-10-28 PROBLEM — R13.10 DYSPHAGIA: Status: ACTIVE | Noted: 2024-10-28

## 2024-10-28 PROBLEM — G12.21 ALS (AMYOTROPHIC LATERAL SCLEROSIS): Status: ACTIVE | Noted: 2024-10-28

## 2024-10-28 PROBLEM — R47.1 DYSARTHRIA: Status: ACTIVE | Noted: 2024-10-28

## 2024-10-29 ENCOUNTER — PATIENT MESSAGE (OUTPATIENT)
Dept: NEUROLOGY | Facility: CLINIC | Age: 69
End: 2024-10-29
Payer: MEDICARE

## 2024-10-30 ENCOUNTER — CLINICAL SUPPORT (OUTPATIENT)
Dept: REHABILITATION | Facility: HOSPITAL | Age: 69
End: 2024-10-30
Attending: PSYCHIATRY & NEUROLOGY
Payer: MEDICARE

## 2024-10-30 ENCOUNTER — OFFICE VISIT (OUTPATIENT)
Dept: NEUROLOGY | Facility: CLINIC | Age: 69
End: 2024-10-30
Payer: MEDICARE

## 2024-10-30 VITALS — HEIGHT: 65 IN | BODY MASS INDEX: 23.14 KG/M2 | OXYGEN SATURATION: 96 % | WEIGHT: 138.88 LBS

## 2024-10-30 DIAGNOSIS — M47.812 SPONDYLOSIS OF CERVICAL REGION WITHOUT MYELOPATHY OR RADICULOPATHY: ICD-10-CM

## 2024-10-30 DIAGNOSIS — M47.816 SPONDYLOSIS OF LUMBAR REGION WITHOUT MYELOPATHY OR RADICULOPATHY: ICD-10-CM

## 2024-10-30 DIAGNOSIS — M54.50 CHRONIC BILATERAL LOW BACK PAIN WITHOUT SCIATICA: ICD-10-CM

## 2024-10-30 DIAGNOSIS — R13.10 DYSPHAGIA, UNSPECIFIED TYPE: ICD-10-CM

## 2024-10-30 DIAGNOSIS — F48.2 PBA (PSEUDOBULBAR AFFECT): ICD-10-CM

## 2024-10-30 DIAGNOSIS — G89.29 CHRONIC BILATERAL LOW BACK PAIN WITHOUT SCIATICA: ICD-10-CM

## 2024-10-30 DIAGNOSIS — Z00.8 NUTRITIONAL ASSESSMENT: ICD-10-CM

## 2024-10-30 DIAGNOSIS — G12.21 ALS (AMYOTROPHIC LATERAL SCLEROSIS): ICD-10-CM

## 2024-10-30 DIAGNOSIS — Z74.09 IMPAIRED MOBILITY AND ADLS: Chronic | ICD-10-CM

## 2024-10-30 DIAGNOSIS — K11.7 SIALORRHEA: ICD-10-CM

## 2024-10-30 DIAGNOSIS — Z78.9 ALTERATION IN INSTRUMENTAL ACTIVITIES OF DAILY LIVING (IADL): Primary | ICD-10-CM

## 2024-10-30 DIAGNOSIS — Z78.9 IMPAIRED MOBILITY AND ADLS: Chronic | ICD-10-CM

## 2024-10-30 DIAGNOSIS — Z98.1 STATUS POST CERVICAL SPINAL FUSION: ICD-10-CM

## 2024-10-30 DIAGNOSIS — G12.21 ALS (AMYOTROPHIC LATERAL SCLEROSIS): Primary | ICD-10-CM

## 2024-10-30 DIAGNOSIS — R47.1 DYSARTHRIA: ICD-10-CM

## 2024-10-30 DIAGNOSIS — Z93.1 GASTROSTOMY STATUS: ICD-10-CM

## 2024-10-30 PROCEDURE — 97165 OT EVAL LOW COMPLEX 30 MIN: CPT

## 2024-10-30 PROCEDURE — 99213 OFFICE O/P EST LOW 20 MIN: CPT | Mod: PBBFAC,25

## 2024-10-30 PROCEDURE — 97162 PT EVAL MOD COMPLEX 30 MIN: CPT

## 2024-10-30 PROCEDURE — 99497 ADVNCD CARE PLAN 30 MIN: CPT | Mod: PBBFAC | Performed by: STUDENT IN AN ORGANIZED HEALTH CARE EDUCATION/TRAINING PROGRAM

## 2024-10-30 PROCEDURE — 99999 PR PBB SHADOW E&M-EST. PATIENT-LVL III: CPT | Mod: PBBFAC,,,

## 2024-10-30 RX ORDER — ATROPINE SULFATE 10 MG/ML
2 SOLUTION/ DROPS OPHTHALMIC 3 TIMES DAILY PRN
Qty: 15 ML | Refills: 3 | Status: ON HOLD | OUTPATIENT
Start: 2024-10-30

## 2024-10-30 RX ORDER — IBUPROFEN 200 MG
200 TABLET ORAL DAILY PRN
Status: ON HOLD | COMMUNITY
Start: 2024-10-30

## 2024-10-30 RX ORDER — ACETAMINOPHEN 500 MG
500 TABLET ORAL 3 TIMES DAILY PRN
Status: ON HOLD | COMMUNITY
Start: 2024-10-30

## 2024-11-01 ENCOUNTER — PATIENT MESSAGE (OUTPATIENT)
Dept: NEUROLOGY | Facility: CLINIC | Age: 69
End: 2024-11-01
Payer: MEDICARE

## 2024-11-01 PROBLEM — K11.7 SIALORRHEA: Status: ACTIVE | Noted: 2024-11-01

## 2024-11-01 PROBLEM — Z74.09 IMPAIRED MOBILITY AND ADLS: Chronic | Status: ACTIVE | Noted: 2024-11-01

## 2024-11-01 PROBLEM — Z78.9 IMPAIRED MOBILITY AND ADLS: Chronic | Status: ACTIVE | Noted: 2024-11-01

## 2024-11-02 PROBLEM — G93.40 ACUTE ENCEPHALOPATHY: Status: ACTIVE | Noted: 2024-11-02

## 2024-11-02 PROBLEM — Z71.89 ACP (ADVANCE CARE PLANNING): Status: ACTIVE | Noted: 2024-11-02

## 2024-11-03 PROBLEM — G92.9 TOXIC ENCEPHALOPATHY: Status: ACTIVE | Noted: 2024-11-02

## 2024-11-18 ENCOUNTER — TELEPHONE (OUTPATIENT)
Dept: INTERVENTIONAL RADIOLOGY/VASCULAR | Facility: CLINIC | Age: 69
End: 2024-11-18
Payer: MEDICARE

## 2024-11-20 ENCOUNTER — TELEPHONE (OUTPATIENT)
Dept: INTERVENTIONAL RADIOLOGY/VASCULAR | Facility: CLINIC | Age: 69
End: 2024-11-20
Payer: MEDICARE

## 2024-12-10 ENCOUNTER — TELEPHONE (OUTPATIENT)
Dept: NEUROLOGY | Facility: CLINIC | Age: 69
End: 2024-12-10
Payer: MEDICARE

## 2024-12-10 DIAGNOSIS — R47.1 DYSARTHRIA: ICD-10-CM

## 2024-12-10 DIAGNOSIS — G12.21 ALS (AMYOTROPHIC LATERAL SCLEROSIS): Primary | ICD-10-CM

## 2024-12-10 DIAGNOSIS — R13.10 DYSPHAGIA, UNSPECIFIED TYPE: ICD-10-CM

## 2024-12-10 RX ORDER — DEXTROMETHORPHAN HYDROBROMIDE AND QUINIDINE SULFATE 20; 10 MG/1; MG/1
1 CAPSULE, GELATIN COATED ORAL EVERY 12 HOURS
Qty: 180 CAPSULE | Refills: 3 | Status: SHIPPED | OUTPATIENT
Start: 2024-12-10 | End: 2025-12-10

## 2024-12-10 NOTE — TELEPHONE ENCOUNTER
Sarah Medina, will you please schedule this pt with Dr Virk at ALS Clinic? Last ALS appt was 10/30/24, I assume her next visit will need to be booked for January?

## 2024-12-10 NOTE — TELEPHONE ENCOUNTER
ALS Clinic Appt    Outgoing call to Katina to schedule follow up ALS Clinic visit with Dr. Virk. No Answer. Sent message to inform appt available on tomorrow, 12/11/24 or 2/12/25 or 2/26/25.       Awaiting reply      [place] RN, BSN, BS  ALS Clinical Care Coordinator  [phone]       wine/liquor

## 2024-12-10 NOTE — TELEPHONE ENCOUNTER
ALS Clinic Appt    Message received from patient's daughter, Laverne, to inquire if Dr. Kennedy has any appt available for ALS Clinic in January. Informed clinic booked, but I can add Mrs. Ambriz to 1/29th with Dr. Virk (she approved).     Laverne indicated that 2/12th is fine. RN Coordinator inquired if should contact her for appts. Laverne stated yes since patient experiencing trouble speaking.       M[place] Hawk RN, BSN, BS  ALS Clinical Care Coordinator  [phone]

## 2025-01-08 DIAGNOSIS — G12.21 ALS (AMYOTROPHIC LATERAL SCLEROSIS): Primary | ICD-10-CM

## 2025-02-10 NOTE — PROGRESS NOTES
"[facility]   Physical Therapy Re-Evaluation     at [facility]     Date: 2/12/2025    Name: Nicci Shannon   MRN: [id number]    Therapy Diagnosis:   Encounter Diagnoses   Name Primary?    ALS (amyotrophic lateral sclerosis)     Impaired functional mobility, balance, gait, and endurance Yes      Physician: Dr. Lena Virk  Physician Orders: Ambulatory Referral for Physical Therapy at ALS Clinic  Plan of Care Expiration: Re-evaluation only  Medical Diagnosis: ALS (G12.21)      Initial Clinic Date: 10/30/2024   ALS Clinic Number: #[id number]     Time In: 10:35  Time Out: 10:55  Total Billable Time: 20 minutes    Precautions: Standard and Fall, Progressive neurological diease   SUBJECTIVE   (2/12/25) Patient reports: she feels like things are pretty good. Her speech and swallowing has gotten a little worse but otherwise doing well. She is still walking and playing piano, but she does endorse issues with fine motor tasks.      (10/30/24) Patient reports: she notices weakness in her hands. Her legs aren't as strong but she is grateful for how strong they are. She has had a few falls (see below for details). She does note that getting up off of low surfaces are more effortful. Her  has to help her more around the house. She is having more issues with fine motor, especially buttons.     History of Present Illness: Nicci is a 69 y.o. female that presents to [facility] secondary to dx of ALS. Denies new medical changes since last clinic visit    From MD note: "Nicci Shannon is a 69 y.o. female referred for consultation regarding speech and swallowing changes. She noticed she lost in her singing voice last fall.  She had to quit the choir in December because of this.  She had no vocal control when singing, wobbling of the voice.  She had no shortness of breath when singing.  Then she started to notice her tongue was becoming lazy and the quality of " Pt to ED for lower abdominal pain beginning this morning at 0400. Pt has had emesis denies any blood.  No emesis at this time      Beny Rojas RN  10/18/18 0365 her speech changed.  She started speech therapy around Feb/March of 2024, but she isn't sure it has helped though she seemed to stabilize.  After being off ST for a couple months she wants to go back. Slowly she started to notice issues with swallowing.  This seemed to get progressively worse as well.  She has to eat a mechanical diet, otherwise she feels things will get stuck.  She will get choked up sipping on water.  She is living on soup.  She has to be careful, even this can cause her to cough or have to clear her throat.  She sometimes feels it hits the back of her throat before she is ready for it. She feels that her cough is pretty strong.  She had been following with ENT a few months prior to this and she picked up on the speech changes.  She ordered a neurology referral and speech therapy referral. She was seen by neurology at [facility].  They did an EMG and some blood work for Sjogren's.  She also had an MRI of her neck.  She was seen by rheumatology at [facility]. They did more blood work.  Overall her workup thus far was fairly unremarkable.  Rheumatology suggested back to neurology, but was willing to do one other test.  She was referred back to ENT for a bx of the salivary gland.  She is scheduled for this next week at [facility].  She has no significant dry eye symptoms but does use eye drops here and there. She never has burning or excessive tearing.  She states she has excessive saliva at night, even during the day she has to wipe her saliva.  She also feels her left face droops some.  She feels her saliva is thick at times, but she does not feel like she has dry mouth. She denies any weight loss.  She has not had shortness of breath.  She had a bad fall last year, but no falls lately. She has stumbled a few times, she attributes to not picking up her feet or feeling a little disequilibrium.  This is chronic, but seems more evident now.  She has no double vision or drooping eyelids. There is no  "fluctuation in her symptoms.  She has no family history of any neurological conditions. No preceding illness or infection. "     Medical History:   Past Medical History:   Diagnosis Date    Bursitis     right hip    DDD (degenerative disc disease), cervical     DDD (degenerative disc disease), lumbar     Depression     General anesthetics causing adverse effect in therapeutic use     Hearing loss     Hypertension     Hypothyroidism     ROBERTO (obstructive sleep apnea)     does not use cpap    Shoulder pain     left    Thyroid disease      Surgical History:   Nicci Shannon  has a past surgical history that includes Cervical fusion; VEIN STRIPPING ; Tonsillectomy; Vaginal delivery; Total abdominal hysterectomy w/ bilateral salpingoophorectomy (Bilateral, 10/31/2019); Abdominal sacrocolpopexy (N/A, 10/31/2019); Partial vulvectomy (Bilateral, 10/31/2019); Surgical procedure for stress incontinence using tension free vaginal tape (N/A, 10/31/2019); Cystoscopy (N/A, 10/31/2019); and Revision of vaginal cuff (N/A, 12/30/2019).  Medications:   Nicci has a current medication list which includes the following prescription(s): aspirin, atorvastatin, cholecalciferol (vitamin d3), nuedexta, estradiol, fluoxetine, gabapentin, ibuprofen, losartan, pantoprazole, tiglutik, and triamcinolone acetonide 0.1%.  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   Exercise Routine / History: none, walks her daughter's dog   Current Level of Function: independence " to modified independence    Falls: none   Near falls: none     Pain:  Current 0/10, worst 0/10, best 0/10   Location: none reported    Pt's goals: Assess for PT needs related to ALS    OBJECTIVE   Patient presents to clinic: walks    Mental status: alert, oriented to person, place, and time, normal mood, behavior, speech, dress, motor activity, and thought processes  Appearance: Casually dressed  Behavior:  calm and cooperative  Attention Span and Concentration: Normal    Posture Alignment: [] forward head  [] forward/rounded shoulders  [] head drop   [] increased thoracic kyphosis  [] sacral sitting  [] unremarkable    Lower Extremity ROM [x] AROM WNL [x] PROM WNL     Lower Extremity Strength  Right LE 10/30/24 2/12/25 Left LE 10/30/24 2/12/25   Hip flexion: 4+/5 4/5 Hip flexion: 4+/5 4/5   Hip extension:  4+/5 4+/5 Hip extension: 4+/5 4+/5   Hip abduction: 5/5 5/5 Hip abduction: 5/5 5/5   Hip adduction: 5/5 5/5 Hip adduction 5/5 5/5   Knee extension: 5/5 5/5 Knee extension: 5/5 5/5   Knee flexion: 5/5 5/5 Knee flexion: 5/5 5/5   Ankle dorsiflexion: 5/5 4/5 Ankle dorsiflexion: 4+/5 4-/5   Ankle plantarflexion: 5/5 5/5 Ankle plantarflexion: 5/5 5/5        Tone:  [] hypotonic [] hypertonic/spastic       Limbs/muscles affected: [] neck musculature  [] trunk [] right arm  [] left arm  [] right leg  [] left leg      Edema: [] dependent edema of bilateral lower extremities   [] pitting [] non-pitting  [x] none observed today     Coordination:   - fine motor: see OT note  - UE coordination: see OT note  - LE coordination: NOT TESTED      Initial Evaluation  Re-Eval 2/12/25     5x Chair Rise Test  15.19 seconds with arms on legs 11.72 seconds   Functional Reach Sitting 12+ inches 12+ inches   Functional Reach Standing 12 inches 9 inches   Activities Balance Confidence (ABC) scale  89.375% 85.625%        Sitting balance static: good  Sitting balance dynamic: good  Standing balance static: good  Standing balance dynamic:   good    Gait:   [] patient is no longer ambulatory   - AD used: No Assistive Device  - Assistance: independence  - Distance: community distances  - Deviations: Normal, does not require assistive devices, decreased toe-to-floor clearance     Endurance Deficit: mild    Functional Mobility (Bed mobility, transfers)  Bed mobility: independence  Supine to sit: independence  Sit to supine: independence  Transfers to bed: independence  Transfers to toilet: independence  Tub/shower transfers: independence  Sit to stand:  independence  Stand pivot: modified independence  Car transfers: independence  Wheelchair mobility: independence      Treatment    No Treatment Provided today.     Education Provided     Education provided re: POC, Educated pt on purpose of PT services now and in the future to assist with mobility training and adaptation education as the ALS disease process progresses. Nicci verbalized good understanding of education provided. Pt has no cultural, educational or language barriers to learning provided.    Written Home Exercises Provided: none provided today    ASSESSMENT    Nicci is a 69 y.o. female referred to outpatient Physical Therapy with a medical diagnosis of ALS. Pt presents with stable lower extremity strength with a minor change in left ankle DF, though remains independent with all functional mobility tasks. She does not demonstrate any significant gait deficits and has declined obtaining an AFO at this time. No formal PT recommendations at this time, though PT encouraged patient to reach out if she starts to have more stumbles and trips and would like to go forward with getting a brace. Otherwise, she should continue to attend [facility] to monitor disease progression.       Equipment recommendations:    AFO(s) left - declined at this time     Order/referral recommendations:    none         Pt prognosis is Fair.     Plan of care discussed with patient: Yes  Pt's spiritual, cultural and  educational needs considered and patient is agreeable to the plan of care.    Anticipated Barriers for therapy: Progressive nature of ALS     Plan   Plan of care Certification: 2/12/2025 ReEvaluation Only    Pt to continue to follow up with referring provider and ALS clinic at physician recommended intervals.       Oly Nielsen, PT

## 2025-02-11 NOTE — PROGRESS NOTES
[facility]   Occupational Therapy Evaluation     at ALS [facility]IDISCIPLIN[facility] CLINIC     Date: 2/12/2025  Name: Nicci Shannon   MRN: [id number]      Therapy Diagnosis:   Encounter Diagnoses   Name Primary?    ALS (amyotrophic lateral sclerosis)     Alteration in instrumental activities of daily living (IADL) Yes        Physician: Dr Virk  Physician Orders: Ambulatory Referral for Occupational Therapy at ALS Clinic  Plan of Care Expiration: Evaluation Only  Medical Diagnosis: ALS (G12.21)     Initial Clinic Date: 10/30/24  ALS Clinic Number: 2    Time In: 10:35  Time Out: 10:55  Total Billable Time: 20 minutes     Precautions: Standard, Fall    SUBJECTIVE   Patient reports: increased weakness on left side     History of Present Illness: Nicci is a 69 y.o. female that presents to [facility] secondary to dx of ALS. Pt was first diagnosed 2024. Symptom onset was Fall of 2023. Difficulty speaking/singing. Now having issues with swallowing.     From MD Note:  nitial HPI 8/22/24:  Nicci Shannon is a 69 y.o. female referred for consultation regarding speech and swallowing changes.  She noticed she lost in her singing voice last fall.  She had to quit the choir in December because of this.  She had no vocal control when singing, wobbling of the voice.  She had no shortness of breath when singing.  Then she started to notice her tongue was becoming lazy and the quality of her speech changed.  She started speech therapy around Feb/March of 2024, but she isn't sure it has helped though she seemed to stabilize.  After being off ST for a couple months she wants to go back.  Slowly she started to notice issues with swallowing.  This seemed to get progressively worse as well.  She has to eat a mechanical diet, otherwise she feels things will get stuck.  She will get choked up sipping on water.  She is living on soup.  She has to be careful, even this can cause her to cough  or have to clear her throat.  She sometimes feels it hits the back of her throat before she is ready for it. She feels that her cough is pretty strong.        Medical History:   Past Medical History:   Diagnosis Date    Bursitis     right hip    DDD (degenerative disc disease), cervical     DDD (degenerative disc disease), lumbar     Depression     General anesthetics causing adverse effect in therapeutic use     Hearing loss     Hypertension     Hypothyroidism     ROBERTO (obstructive sleep apnea)     does not use cpap    Shoulder pain     left    Thyroid disease      Surgical History:   Nicci Shannon  has a past surgical history that includes Cervical fusion; VEIN STRIPPING ; Tonsillectomy; Vaginal delivery; Total abdominal hysterectomy w/ bilateral salpingoophorectomy (Bilateral, 10/31/2019); Abdominal sacrocolpopexy (N/A, 10/31/2019); Partial vulvectomy (Bilateral, 10/31/2019); Surgical procedure for stress incontinence using tension free vaginal tape (N/A, 10/31/2019); Cystoscopy (N/A, 10/31/2019); and Revision of vaginal cuff (N/A, 12/30/2019).  Medications:   Nicci has a current medication list which includes the following prescription(s): aspirin, atorvastatin, cholecalciferol (vitamin d3), nuedexta, estradiol, fluoxetine, gabapentin, ibuprofen, losartan, pantoprazole, tiglutik, and triamcinolone acetonide 0.1%.  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  Job description includes: n/a  Driving status: no longer driving     Falls: 0  Near falls: 0    Pain:  Current  0/10    Pt's goals: remain as indep as possible     OBJECTIVE   Patient presents to clinic: walks    Cognitive Exam:  Oriented: Person, Place, Time, and Situation  Behaviors: normal, cooperative  Follows Commands/attention: Follows multistep  commands    Dominant hand:  right     Posture Alignment: [] forward head  [] forward/rounded shoulders  [] head drop   [] increased thoracic kyphosis  [] sacral sitting  [x] unremarkable    Joint integrity: Firm end feeling  Skin integrity: warm/dry    Tone:  normal    Edema: [x] none  [] pitting [] non-pitting      Coordination:   - Fine motor: intact , mild delay on left hand   - UE coordination: intact    Gross motor coordination:   Rapid Alternating Movement: slowed for left hand     Fine motor coordination: 9 hole peg test  2/12/204  Right: 23.46 s  Left: 34.91 s    Deferred: still playing the piano does report an increase drag and coordination decline on left     Sensation: reports normal sensation      Joint evaluation: AROM for bilateral upper extremities WFL    Fist: WFL     Strength 2/12/2025 2/12/2025   **within available ROM** Right Left   Shoulder flex 5/5 4/5   Shoulder abd 5/5 4/5   Shoulder ER 5/5 4+/5   Shoulder IR 5/5 5/5   Shoulder Extension 5/5 4/5   Elbow flex 5/5 5/5   Elbow ext 4+/5 4+/5   Wrist flex 5/5 4+/5   Wrist ext 5/5 4+/5     Sitting balance static: good  Sitting balance dynamic: good  Standing balance static: good  Standing balance dynamic: good    ADL's:  Feeding: Puree foods - able to blend via hand held   Grooming: Mod I, electric tooth brush   Hygiene: Mod I  UB Dressing: Mod I buttons are more difficult , able to complete x1 on shirt  LB Dressing: Mod I, slip on sneakers   Toileting: Mod I  Bathing: Mod I - standing to shower     IADL's:  Homecare: Mod I  Cooking: Mod I with small meals   Laundry: Mod I  Yard work: not a previous role  Telephone/technology use:  Mod I  Money management: Mod I  Medication management: Mod I       Treatment     No Treatment Provided today.   Education Provided     Education provided re: POC; Purpose of OT services now and in the future to assist with maximizing independence, adaptation/modifications, and the ALS disease process. Energy conservation     Nicci verbalized good understanding of education provided. Pt has no cultural, educational or language barriers to learning provided.      Written Home Exercises Provided: none provided today    ASSESSMENT    Nicci is a 69 y.o. female referred to outpatient Occupational Therapy with a medical diagnosis of ALS. Pt presents with ALS. She remains Mod I with all ADL/IADLs at this time. She does have an increase in fine motor coordination deficits for her left hand but is not impacting her daily routines. No formal OT needs at this time.       Equipment recommendations:   -installed grab bar (daughter reported her dad can complete)      Referral recommendations: none           Pt prognosis is Good.     Plan of care discussed with patient: Yes  Pt's spiritual, cultural and educational needs considered and patient is agreeable to the plan of care.    Anticipated Barriers for therapy: Progressive nature of ALS     Medical Necessity is demonstrated by the following  Occupational Profile/History  Co-morbidities and personal factors that may impact the plan of care [x] LOW: Brief chart review  [] MODERATE: Expanded chart review   [] HIGH: Extensive chart review    Moderate / High Support Documentation:      Examination  Performance deficits relating to physical, cognitive or psychosocial skills that result in activity limitations and/or participation restrictions  [x] LOW: addressing 1-3 Performance deficits  [] MODERATE: 3-5 Performance deficits  [] HIGH: 5+ Performance deficits (please support below)    Moderate / High Support Documentation:    Physical:  Fine Motor Coordination  Pain  Speech/communication deficits     Cognitive:  No Deficits    Psychosocial:    No Deficits      Treatment Options [x] LOW: Limited options  [] MODERATE: Several options  [] HIGH: Multiple options      Decision Making/ Complexity Score: low         Plan   Plan of care Certification: 2/12/2025 Evaluation Only    Pt to continue to follow up with referring provider and ALS clinic at physician recommended intervals.       XIN Mcdermott

## 2025-02-12 ENCOUNTER — CLINICAL SUPPORT (OUTPATIENT)
Dept: REHABILITATION | Facility: HOSPITAL | Age: 70
End: 2025-02-12
Attending: PSYCHIATRY & NEUROLOGY
Payer: MEDICARE

## 2025-02-12 ENCOUNTER — OFFICE VISIT (OUTPATIENT)
Dept: NEUROLOGY | Facility: CLINIC | Age: 70
End: 2025-02-12
Payer: MEDICARE

## 2025-02-12 VITALS
HEIGHT: 65 IN | WEIGHT: 134.94 LBS | SYSTOLIC BLOOD PRESSURE: 111 MMHG | HEART RATE: 86 BPM | DIASTOLIC BLOOD PRESSURE: 55 MMHG | OXYGEN SATURATION: 97 % | BODY MASS INDEX: 22.48 KG/M2

## 2025-02-12 DIAGNOSIS — F48.2 PSEUDOBULBAR AFFECT: ICD-10-CM

## 2025-02-12 DIAGNOSIS — M54.50 CHRONIC BILATERAL LOW BACK PAIN WITHOUT SCIATICA: ICD-10-CM

## 2025-02-12 DIAGNOSIS — Z78.9 IMPAIRED MOBILITY AND ADLS: ICD-10-CM

## 2025-02-12 DIAGNOSIS — R13.10 DYSPHAGIA, UNSPECIFIED TYPE: ICD-10-CM

## 2025-02-12 DIAGNOSIS — Z98.1 STATUS POST CERVICAL SPINAL FUSION: ICD-10-CM

## 2025-02-12 DIAGNOSIS — Z00.8 NUTRITIONAL ASSESSMENT: ICD-10-CM

## 2025-02-12 DIAGNOSIS — G12.21 ALS (AMYOTROPHIC LATERAL SCLEROSIS): ICD-10-CM

## 2025-02-12 DIAGNOSIS — Z93.1 GASTROSTOMY STATUS: ICD-10-CM

## 2025-02-12 DIAGNOSIS — G12.21 ALS (AMYOTROPHIC LATERAL SCLEROSIS): Primary | ICD-10-CM

## 2025-02-12 DIAGNOSIS — R47.1 DYSARTHRIA: Primary | ICD-10-CM

## 2025-02-12 DIAGNOSIS — R79.89 ABNORMAL TSH: ICD-10-CM

## 2025-02-12 DIAGNOSIS — F45.8 BRUXISM (TEETH GRINDING): ICD-10-CM

## 2025-02-12 DIAGNOSIS — M47.816 SPONDYLOSIS OF LUMBAR REGION WITHOUT MYELOPATHY OR RADICULOPATHY: ICD-10-CM

## 2025-02-12 DIAGNOSIS — G89.29 CHRONIC BILATERAL LOW BACK PAIN WITHOUT SCIATICA: ICD-10-CM

## 2025-02-12 DIAGNOSIS — Z74.09 IMPAIRED FUNCTIONAL MOBILITY, BALANCE, GAIT, AND ENDURANCE: Primary | ICD-10-CM

## 2025-02-12 DIAGNOSIS — E03.9 HYPOTHYROIDISM: ICD-10-CM

## 2025-02-12 DIAGNOSIS — R47.1 DYSARTHRIA: ICD-10-CM

## 2025-02-12 DIAGNOSIS — M47.812 SPONDYLOSIS OF CERVICAL REGION WITHOUT MYELOPATHY OR RADICULOPATHY: ICD-10-CM

## 2025-02-12 DIAGNOSIS — R13.12 OROPHARYNGEAL DYSPHAGIA: ICD-10-CM

## 2025-02-12 DIAGNOSIS — Z74.09 IMPAIRED MOBILITY AND ADLS: ICD-10-CM

## 2025-02-12 DIAGNOSIS — F48.2 PBA (PSEUDOBULBAR AFFECT): ICD-10-CM

## 2025-02-12 DIAGNOSIS — Z78.9 ALTERATION IN INSTRUMENTAL ACTIVITIES OF DAILY LIVING (IADL): Primary | ICD-10-CM

## 2025-02-12 PROCEDURE — 99999 PR PBB SHADOW E&M-EST. PATIENT-LVL IV: CPT | Mod: PBBFAC,,,

## 2025-02-12 PROCEDURE — 99214 OFFICE O/P EST MOD 30 MIN: CPT | Mod: PBBFAC

## 2025-02-12 PROCEDURE — 97164 PT RE-EVAL EST PLAN CARE: CPT

## 2025-02-12 PROCEDURE — 92522 EVALUATE SPEECH PRODUCTION: CPT

## 2025-02-12 PROCEDURE — 92610 EVALUATE SWALLOWING FUNCTION: CPT

## 2025-02-12 PROCEDURE — 97165 OT EVAL LOW COMPLEX 30 MIN: CPT

## 2025-02-12 RX ORDER — PRAVASTATIN SODIUM 40 MG/1
40 TABLET ORAL DAILY
COMMUNITY

## 2025-02-12 RX ORDER — BACLOFEN 10 MG/1
10 TABLET ORAL NIGHTLY
Qty: 90 TABLET | Refills: 3 | Status: SHIPPED | OUTPATIENT
Start: 2025-02-12 | End: 2026-02-12

## 2025-02-12 NOTE — PROGRESS NOTES
"MNT follow-up:    Pt and her daughter present for 2nd ALS clinic visit.  Pt continues on puree/soft diet and drinks 1-2 Ensure Complete (350 calories each) daily.  Tube feeding reccs were made at pt's initial ALS clinic visit; pt decided to keep G-tube placement "on hold" for now.  Pt blenderizes her own meals.  We discussed how to maximize caloric intake of those.  She's also agreeable to drinking an additional po supplement daily.    Weight history:           Today, 2/12/2025:  61.2 kg (134 lb 14.7 oz) - standing scale                      0/30/2025:  63 kg (138 lb 14.2 oz) - standing scale    Will continue to follow to monitor nutritional status per ALS clinic protocol.    "

## 2025-02-12 NOTE — PROGRESS NOTES
NEUROLOGY  [facility]  [address] [place]  [address]  [phone] (office) / [phone] (fax)    Patient Name:  Nicci Shannon  :  1955  MR #:  [id number]  Acct #:  [id number]    Date of Clinic Follow Up: 2025  Name of Neurologist: Lena Virk DO    Other Physicians:  Jasper Moreno MD (Primary Care Physician); No ref. provider found (Referring)    Subjective:    Nicci Shannon is a 69 y.o. female here today for her 2nd ALS clinic.    Her initial presentation was to Dr. Rhoades on the [facility] ([place] for dysphagia that was onset in late . There was also some tongue weakness. By 2024 there had been some dysarthric speech changes.  See saw Dr. Virk in 2024 and there was concern for MND given her presentation. EMG/NCS revealed active denervation (fibs) in muscles from the cranial and cervical spinal regions, but none in the thoracic or lumbar regions. She was seen a second time in Dr. 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 She was referred to the ALS Clinic for further evaluation and confirmed to have ALS.    Patient was been doing pretty well since her last OV. However, of note she presented to the ED on 24 with AMS. Patient was admitted but workup was negative for an acute issue such as stroke or seizure. It was concluded that her presentation was most likely caused as a side effect of atropine drops that were prescribed at last ALS clinic for sialorrhea, so the drops have been discontinued since. Patient has also been grinding her teeth lately and would like to try something for this.    Here with:  Daughter Marjan     Strength:  Strength somewhat worsening particularly in left hand, otherwise relatively stable.     Dysarthria:  Speech was gotten worse since the last time she was here, and it's getting harder to understand her. She often has to repeat herself. Has a speech assistant device that she tries using.    "  Dysphagia: States swallowing is very deliberate and is still on a soft liquid diet. She's hasn't proceeded with the feeding tube that was recommended at last OV but is interested in having it done soon.     Sialorrhea:  Yes, still requiring tissues but manageable. She was originally given GP 1mg TID that was over-effective. Then started atropine drops at last ALS clinic but stopped due to AMS. Has since started taking only 1mg GP which helps manage the drooling.     Constipation:  Mild, typically has a bowel movement 2-3 times a week and is taking OTC miralax as needed. However, patient states that this has always been her baseline.     Sleep:  Sleeping okay, still not using any CPAP or other device     Gait:  States her walking has been good since last OV, still not having to use a cane or walker.     Falls:  No falls since last OV     Breathing:  Thinks her breathing has been good lately.      Bladder:  No issues     Pain:  Denies     Mood:  Very good     PBA:  Denies     Memory:  States it's maybe been a little off, but "pretty good considering I'm 69"      Treatment to date:    Riluzole  Nuedexta    Review of Systems:  See Above HPI    ALSFRS:  ALSFRS Score: 33  FRS-6 Score: 17             Past Medical, Surgical, Family & Social History:   Reviewed and updated.    Home Medications:     Current Outpatient Medications:     cholecalciferol, vitamin D3, 1,250 mcg (50,000 unit) capsule, Take 50,000 Units by mouth twice a week., Disp: , Rfl:     dextromethorphan-quinidine 20-10 mg (NUEDEXTA) 20-10 mg per capsule, Take 1 capsule by mouth every 12 (twelve) hours., Disp: 180 capsule, Rfl: 3    estradioL (ESTRACE) 2 MG tablet, Take 2 mg by mouth once daily., Disp: , Rfl:     FLUoxetine 20 MG capsule, Take 1 capsule (20 mg total) by mouth once daily., Disp: 30 capsule, Rfl: 1    gabapentin (NEURONTIN) 300 MG capsule, Take 1 capsule by mouth nightly as needed (pain)., Disp: , Rfl:     ibuprofen (ADVIL,MOTRIN) 200 MG " "tablet, Take 1 tablet (200 mg total) by mouth daily as needed for Pain., Disp: , Rfl:     losartan (COZAAR) 25 MG tablet, Take 1 tablet (25 mg total) by mouth every evening., Disp: 90 tablet, Rfl: 0    pantoprazole (PROTONIX) 40 MG tablet, Take 1 tablet (40 mg total) by mouth once daily., Disp: 90 tablet, Rfl: 0    pravastatin (PRAVACHOL) 40 MG tablet, Take 40 mg by mouth once daily., Disp: , Rfl:     riluzole (TIGLUTIK) 50 mg/10 mL Susp, Take 10 mLs by mouth 2 (two) times a day., Disp: 600 mL, Rfl: 11    baclofen (LIORESAL) 10 MG tablet, Take 1 tablet (10 mg total) by mouth nightly., Disp: 90 tablet, Rfl: 3      Objective:    Vitals:    02/12/25 0818 02/12/25 1153   BP: (!) 111/55    BP Location: Right arm    Patient Position: Sitting    Pulse: 86    SpO2:  97%   Weight: 61.2 kg (134 lb 14.7 oz)    Height: 5' 5" (1.651 m)          ALS Physical Exam:     ALS Physical Exam PBA Speech Facial Strength Tongue Strength Tongue Appear Jaw Jerk Limb Fasciculations Neck Flex MMT   2/12/2025   9:10 AM No severe moderate severe atrophied No Absent 5     ALS Physical Exam Neck Ext MMT Shd Abd R MMT Shd Abd L MMT Wrist Ext R MMT Wrist Ext L MMT Hip Flex R MMT Hip Flex L MMT Knee Ext R MMT   2/12/2025   9:10 AM 5 5 5 5 5 5 4+ 5     ALS Physical Exam Knee Ext L MMT Foot DF R MMT Foot DF L MMT UMN Signs Arms Type UMN Signs Legs UMN Signs Legs Type   2/12/2025   9:10 AM 5 5 4 Hyperreflexia Yes Upgoing L plantar reflex          GENERAL:  General appearance: Well, non-toxic appearing.  No apparent distress.  Respiratory:  Normal.  Extremities: Normal.    MENTAL STATUS:  Alertness, attention span & concentration: normal.  Language: normal.  Orientation to self, place & time:  normal.  Memory, recent & remote: normal.  Fund of knowledge: normal.  MMSE:    SPEECH:  Clear and fluent.  Follows complex commands.    CRANIAL NERVES:  Cranial Nerves II-XII were examined.  II - Visual fields: normal.  III, IV, VI: PERRL, EOMI, No ptosis, No " nystagmus.  V - Facial sensation: normal.  VII - Face symmetry & mobility: Unable to puff out her cheeks  VIII - Hearing: normal.  IX, X - Palate: mobile & midline.  XI - Shoulder shrug: normal.  XII - Tongue protrusion: Very limited mobility of tongue, unable to press the inside of either cheek    GROSS MOTOR:  Gait & station: normal.  Tone: normal.  Abnormal movements: none.  Finger-nose & Heel-knee-shin: normal.  Rapid alternating movements & drift: normal.    MUSCLE STRENGTH:     Fascics Atrophy RIGHT    LEFT Atrophy Fascics     5 Neck Ext. 5       5 Neck Flex 5       5 Deltoids 5       5 Sh.Ext.Rot. 5       5 Sh.Int.Rot. 5       5 Biceps 5       5 Triceps 5       5 Forearm.Pr. 5       5 Wrist Ext. 5       5 Wrist Flex 5       5 Finger Ext. 5       5 Finger Flex 5       5 FPL 5       5 Inteross. 4                         5 Iliopsoas 4+       5 Hip Abduct 5       5 Hip Adduct 5       5 Quads 5       5 Hams 5       5 Dorsiflex 4       5 Plantar Flex 5        Ankle Elkin         Ankle Invert         Toe Ext.         Toe Flex                          REFLEXES:    RIGHT Reflex   LEFT   2+ Biceps 3+   2+ Brachiorad. 3+   2+ Triceps 2+   - Pectoralis -   - Jaw Jerk -   - Mtz's -        2+ Patellar 2+   2+ Ankle 1+   + Suprapatellar -             Down PLANTAR Up going     SENSORY:  Light touch: Normal throughout.  Sharp touch: Normal throughout.  Vibration: Normal throughout.    Diagnostic Data Reviewed:     TSH = 8.750 (11/2/24)    MRI Brain (11/2/24)  1. There is motion artifact on all sequences.  2. Given motion, no acute intracranial abnormalities identified.  3. Chronic involutional and chronic ischemic changes.      Assessment and Plan    Nicci Shannon is a 69 y.o.female here for her 2nd ALS clinic for follow-up.  I and the members of the multidisciplinary team have assessed Nicci Shannon and have made the following recommendations:    Will proceed with feeding tube given worsening difficulty with  swallowing. Reordered CT A&P and put in referral to IR. Started baclofen 10mg to take at night for reported grinding of teeth and lip biting. Will order CBC and CMP given riluzole use. Will recheck TSH since it was unusually high about 3 months ago. Patient's BP today in clinic was 111/55, so told patient she could hold off on taking her losartan for the time being.    Problem List Items Addressed This Visit          Neuro    ALS (amyotrophic lateral sclerosis) - Primary    Relevant Orders    CBC auto differential (Completed)    Comprehensive metabolic panel (Completed)    Ambulatory referral/consult to Interventional RAD    CT Abdomen Pelvis  Without Contrast    Dysarthria       Psychiatric    Pseudobulbar affect       ENT    Bruxism (teeth grinding)    Relevant Medications    baclofen (LIORESAL) 10 MG tablet       GI    Dysphagia    Relevant Orders    Ambulatory referral/consult to Interventional RAD    CT Abdomen Pelvis  Without Contrast       Other    Impaired mobility and ADLs (Chronic)     Other Visit Diagnoses       Nutritional assessment        Gastrostomy status        PBA (pseudobulbar affect)        Chronic bilateral low back pain without sciatica        Spondylosis of lumbar region without myelopathy or radiculopathy        Spondylosis of cervical region without myelopathy or radiculopathy        Status post cervical spinal fusion (C4-C7 ACDF)        Abnormal TSH        Relevant Orders    TSH (Completed)    Hypothyroidism        Relevant Orders    TSH (Completed)              The patient will return to clinic in 3 months.           This evaluation was completed in >45  Minutes over 50% of the time spent on education & counseling. This includes face to face time and non-face to face time preparing to see the patient (eg, review of tests), obtaining and/or reviewing separately obtained history, documenting clinical information in the electronic or other health record, independently interpreting results and  communicating results to the patient/family/caregiver, or care coordinator.      Marshall Kellerman, PA-C with supervision of Lena Butler DO

## 2025-02-12 NOTE — PATIENT INSTRUCTIONS
ALS Clinic After Visit Notes:  Please reach out to the clinic coordinator if you do not receive equipment / follow up calls for services that were ordered today within 1 week.     Speech therapy recommendations:  Consider peg tube for primary means of nutrition, pleasure oral feedings    Continue working with Team [place] for Augmentative-Alternative Communication

## 2025-02-12 NOTE — PROGRESS NOTES
Subjective:       Patient ID: Nicci Shannon is a 69 y.o. female.    Chief Complaint: Follow-up (nutrition)      HPI      Mrs. Shannon is a 69-year-old female who is presenting to the ALS Clinic for multidisciplinary care.  Her symptoms started in the fall of 2023 with voice changes following by difficulty swallowing.  She has PBA.Her past medical history significant for hypertension, hypothyroidism, DDD of the cervical and lumbar spine, status post ACDF at C4-7.  She is s/p  cervical fusion (ACDF at C4-7) in 2017 or 2018 .She is being seen by the physical medicine and rehabilitation service for pain management. She was last seen on 10/30/2024. She was maintained on p.r.n. ibuprofen and p.r.n. Tylenol.    The patient is coming huber the clinic for follow up.  Her neck pain has been under good control.    Her low back pain has been under good control.  It is an intermittent aching and stiffness in the lower lumbar spine and across her back.  She denies any radiation to her legs.  It is worse when she wakes up in the morning.  It is better with a hot shower.  Her maximum pain is 3-4/10 and minimum 1/10.  Today it is 1/10.  She has mild lower extremity weakness.  He denies any leg numbness.  She denies any bowel or bladder incontinence.    She denies any arthralgias.      He is currently on:   Over-the-counter Advil p.r.n., usually once times per month.  OTC Tylenol, nonce recently    Past Medical History:   Diagnosis Date    Bursitis     right hip    DDD (degenerative disc disease), cervical     DDD (degenerative disc disease), lumbar     Depression     General anesthetics causing adverse effect in therapeutic use     Hearing loss     Hypertension     Hypothyroidism     ROBERTO (obstructive sleep apnea)     does not use cpap    Shoulder pain     left    Thyroid disease         Review of patient's allergies indicates:   Allergen Reactions    Atropine Hallucinations        Review of Systems   Constitutional:  Negative for  fatigue.   HENT:  Positive for trouble swallowing and voice change.    Eyes:  Negative for visual disturbance.   Respiratory:  Negative for shortness of breath.    Cardiovascular:  Negative for chest pain.   Gastrointestinal:  Negative for nausea and vomiting.   Genitourinary:  Negative for difficulty urinating.   Musculoskeletal:  Positive for back pain. Negative for gait problem and neck pain.   Neurological:  Positive for dizziness, speech difficulty, weakness and headaches.   Psychiatric/Behavioral:  Negative for behavioral problems.              Objective:      Physical Exam  Vitals reviewed.   Constitutional:       General: She is not in acute distress.     Appearance: She is well-developed.   HENT:      Head: Normocephalic and atraumatic.   Abdominal:      Palpations: Abdomen is soft.   Musculoskeletal:      Cervical back: Normal range of motion. No tenderness.      Comments: BUE:  ROM:   RUE: full.   LUE: full.  Strength:    RUE: 5/5 at shoulder abduction, 5 elbow flexion, 5 elbow extension, 5 hand .   LUE: 5/5 at shoulder abduction, 5 elbow flexion, 5 elbow extension, 5 hand .  Sensation to pinprick:   RUE: intact.   LUE: intact.      BLE:  ROM:     RLE: full.      LLE: full.   Knee crepitus:     RLE: -ve.      LLE: -ve.   Strength:    RLE: 5/5 at hip flexion, 5 knee extension, 5 ankle DF, 5 ankle PF.   LLE: 5/5 at hip flexion, 5 knee extension, 5 ankle DF, 5 ankle PF.  Sensation to pinprick:     RLE: intact.      LLE: intact.   SLR (sitting):      RLE: -ve.      LLE: -ve.     -ve tenderness over lumbar spine.     Skin:     General: Skin is warm.   Neurological:      Mental Status: She is alert.   Psychiatric:         Behavior: Behavior normal.         Assessment:       1. ALS (amyotrophic lateral sclerosis)    2. Nutritional assessment    3. Dysarthria    4. Dysphagia, unspecified type    5. Gastrostomy status    6. PBA (pseudobulbar affect)    7. Chronic bilateral low back pain without sciatica     8. Spondylosis of lumbar region without myelopathy or radiculopathy    9. Spondylosis of cervical region without myelopathy or radiculopathy    10. Status post cervical spinal fusion (C4-C7 ACDF)    11. Impaired mobility and ADLs        Plan:       - Continue ibuprofen (ADVIL,MOTRIN) 200 MG tablet; Take 1 tablet (200 mg total) by mouth daily as needed for Pain.  - Continue acetaminophen (TYLENOL) 500 MG tablet; Take 1 tablet (500 mg total) by mouth 3 (three) times daily as needed for Pain.  - She was told she may take over-the-counter topical creams or analgesic patches.  - Follow up in ALS clinic.      This note was partly generated with [email or web address] voice recognition software. I apologize for any possible typographical errors.

## 2025-02-12 NOTE — PROGRESS NOTES
CHIEF COMPLAINT:  Follow-up (nutrition) and ALS    HISTORY OF PRESENT ILLNESS: Nicci Shannon is a 69 y.o. female with onset of change in singing voice in the fall of 2023, progressed to difficulty speaking (slurred) and abnormal swallow - choking on liquids, losing weight, difficulty with pills. Ambulatory with grossly intact strength. No dyspnea on exertion, no orthopnea. No significant tobacco history. Hx ROBERTO, not being treated -well rested, naps in the afternoon.       PAST MEDICAL HISTORY:    Past Medical History:   Diagnosis Date    Bursitis     right hip    DDD (degenerative disc disease), cervical     DDD (degenerative disc disease), lumbar     Depression     General anesthetics causing adverse effect in therapeutic use     Hearing loss     Hypertension     Hypothyroidism     ROBERTO (obstructive sleep apnea)     does not use cpap    Shoulder pain     left    Thyroid disease        PAST SURGICAL HISTORY:    Past Surgical History:   Procedure Laterality Date    ABDOMINAL SACROCOLPOPEXY N/A 10/31/2019    Procedure: SACROCOLPOPEXY, ABDOMINAL;  Surgeon: Reagan Maldonado MD;  Location: [facility] OR;  Service: OB/GYN;  Laterality: N/A;    CERVICAL FUSION      CYSTOSCOPY N/A 10/31/2019    Procedure: CYSTOSCOPY;  Surgeon: Reagan Maldonado MD;  Location: [facility] OR;  Service: OB/GYN;  Laterality: N/A;    PARTIAL VULVECTOMY Bilateral 10/31/2019    Procedure: VULVECTOMY, PARTIAL - Labial Mass Resection;  Surgeon: Reagan Maldonado MD;  Location: [facility] OR;  Service: OB/GYN;  Laterality: Bilateral;    REVISION OF VAGINAL CUFF N/A 12/30/2019    Procedure: REVISION, VAGINAL CUFF;  Surgeon: Reagan Maldonado MD;  Location: [facility] OR;  Service: OB/GYN;  Laterality: N/A;    SURGICAL PROCEDURE FOR STRESS INCONTINENCE USING TENSION FREE VAGINAL TAPE N/A 10/31/2019    Procedure: SURGICAL PROCEDURE, USING TENSION FREE VAGINAL TAPE, FOR STRESS INCONTINENCE;  Surgeon: Reagan Maldonado MD;  Location: [facility] OR;  Service: OB/GYN;   Laterality: N/A;    TONSILLECTOMY      TOTAL ABDOMINAL HYSTERECTOMY W/ BILATERAL SALPINGOOPHORECTOMY Bilateral 10/31/2019    Procedure: HYSTERECTOMY, TOTAL, ABDOMINAL, WITH BILATERAL SALPINGO-OOPHORECTOMY;  Surgeon: Reagan Maldonado MD;  Location: [facility];  Service: OB/GYN;  Laterality: Bilateral;    VAGINAL DELIVERY      x2    VEIN STRIPPING          FAMILY HISTORY:                Family History   Problem Relation Name Age of Onset    Heart disease Mother      Stroke Father         SOCIAL HISTORY:          Occupation / Environment:   Social support: lives in [place] -  living siblings, daughter Marjan here today, son Janes,  Jozef., Yaz[employer] support  Social History     Tobacco Use   Smoking Status Former    Current packs/day: 0.00    Average packs/day: 0.5 packs/day for 4.0 years (2.0 ttl pk-yrs)    Types: Cigarettes    Start date: 1972    Quit date: 1976    Years since quittin.1   Smokeless Tobacco Never       ALLERGIES:    Review of patient's allergies indicates:   Allergen Reactions    Atropine Hallucinations       CURRENT MEDICATIONS:    Current Outpatient Medications:     atorvastatin (LIPITOR) 20 MG tablet, Take 1 tablet (20 mg total) by mouth once daily., Disp: 90 tablet, Rfl: 3    cholecalciferol, vitamin D3, 1,250 mcg (50,000 unit) capsule, Take 50,000 Units by mouth twice a week., Disp: , Rfl:     dextromethorphan-quinidine 20-10 mg (NUEDEXTA) 20-10 mg per capsule, Take 1 capsule by mouth every 12 (twelve) hours., Disp: 180 capsule, Rfl: 3    estradioL (ESTRACE) 2 MG tablet, Take 2 mg by mouth once daily., Disp: , Rfl:     FLUoxetine 20 MG capsule, Take 1 capsule (20 mg total) by mouth once daily., Disp: 30 capsule, Rfl: 1    gabapentin (NEURONTIN) 300 MG capsule, Take 1 capsule by mouth nightly as needed (pain)., Disp: , Rfl:     ibuprofen (ADVIL,MOTRIN) 200 MG tablet, Take 1 tablet (200 mg total) by mouth daily as needed for Pain., Disp: , Rfl:     losartan (COZAAR) 25 MG tablet,  "Take 1 tablet (25 mg total) by mouth every evening., Disp: 90 tablet, Rfl: 0    pantoprazole (PROTONIX) 40 MG tablet, Take 1 tablet (40 mg total) by mouth once daily., Disp: 90 tablet, Rfl: 0    riluzole (TIGLUTIK) 50 mg/10 mL Susp, Take 10 mLs by mouth 2 (two) times a day., Disp: 600 mL, Rfl: 11    triamcinolone acetonide 0.1% (KENALOG) 0.1 % paste, Place 1 application  onto teeth after meals as needed (mouth irritation)., Disp: , Rfl:     REVIEW OF SYSTEMS:   Pulmonary related symptoms as per HPI.  Gen:  + weight loss, no fever, no night sweats  HEENT:  + sinus congestion, no throat clearing, ++ dysphagia, ++ voice changes, no sialorrhea  CV:  no chest pain, no orthopnea, no paroxysmal nocturnal dyspnea  GI:  no melena, no hematochezia, no diarrhea, + constipation with change in diet  :  no dysuria, no hematuria, no incontinence  Neuro:  No focal weakness  Sleep:  + snoring; sleep quality adequate     PHYSICAL EXAM:   Respiratory Rate: 12   Vitals:    02/12/25 0818   Pulse: 86   Weight: 61.2 kg (134 lb 14.7 oz)   Height: 5' 5" (1.651 m)   PainSc: 0-No pain     GENERAL:  Alert, calm, in no  distress  HEENT:  Normal conjunctiva, normal EOM, nasal and oral mucosa normal, tongue + fascic - limited protrusion  NECK:  supple; no palpable lymphadenopathy or masses, no jugular venous distention.  CVS: regular rate and rhythm, no murmers, gallops or rubs  PULM: Grossly decreased inspiratory capacity, normal to percussion and palpation, clear to auscultation bilaterally with no wheezes, crackles or rhonchi, + accessory muscle use with inspiratory effort  ABDOMEN:  soft nontender/nondistended, rise with inspiration, contraction with cough  EXTREMITIES no cyanosis, clubbing or edema  NEURO:  bulbar weakness, ambulatory    CONTRIBUTORY STUDIES:     PULMONARY FUNCTION TESTS: FVC 2.81L 90%  lpm, SpO2 97%      ACTIVE PULMONARY PROBLEMS:    ICD-10-CM ICD-9-CM    1. ALS (amyotrophic lateral sclerosis)  G12.21 335.20     "   2. Nutritional assessment  Z00.8 V72.85       3. Dysarthria  R47.1 784.51       4. Dysphagia, unspecified type  R13.10 787.20       5. Gastrostomy status  Z93.1 V44.1       6. PBA (pseudobulbar affect)  F48.2 310.81       7. Chronic bilateral low back pain without sciatica  M54.50 724.2     G89.29 338.29       8. Spondylosis of lumbar region without myelopathy or radiculopathy  M47.816 721.3       9. Spondylosis of cervical region without myelopathy or radiculopathy  M47.812 721.0       10. Status post cervical spinal fusion (C4-C7 ACDF)  Z98.1 V45.4       11. Impaired mobility and ADLs  Z74.09 V49.89     Z78.9            ASSESSMENT&PLAN:  Neuromuscular respiratory weakness - higher FVC today  2.  ROBERTO - not symptomatic  3.  Cough grossly intact  4.  Dysphagia - agrees with recommendation to proceed with CT abd ([facility]) and referral to IR for gastrostomy at this time  5.  Sialorrhea - GP 1 mg q am          Total time spent on the visit: 50 minutes         Time spent on review of medical records and obtaining history from other sources: 10 minutes         Time spent conducting patient interview and physical exam: 30 minutes         Time spent ordering treatments, referring to other medical professionals, coordination of care, and documenting the encounter: 10 minutes

## 2025-02-12 NOTE — PROGRESS NOTES
[facility]      SPEECH THERAPY NEUROLOGICAL REHABILITATION EVALUATION    ALS MULTIDISCIPLINARY CLINIC     Date: 2/12/2025    Name: Nicci Shannon   MRN: [id number]    Therapy Diagnosis:   Encounter Diagnoses   Name Primary?    ALS (amyotrophic lateral sclerosis)     Dysarthria Yes    Oropharyngeal dysphagia       Physician: Dr. Lena Virk  Physician Orders: Ambulatory Referral for Speech  Plan of Care Expiration: Evaluation Only  Medical Diagnosis: ALS    Initial Clinic Evaluation Date: 10/30/2024  ALS Clinic Number: [id number]    Time In: 8:26 am  Time Out: 8:54 am  Procedure   Evaluation of Speech Sound Production   Swallow and Oral Function Evaluation      Precautions: progressive degenerative disease, Standard, Communication, Dysphagia, and Aspiration    Subjective   Date of Onset:  Symptoms began in 2023  History of Current Condition: Nicci Shannon  presents to the [facility] secondary to the diagnosis of ALS Disease. New medical changes since last visit include decline in speech intelligibility, decline in breath-speech coordination, and difficulty swallowing .   Chief Complaint: speech clarity, running out of air when talking, and swallowing  Past Medical History:  has a past medical history of Bursitis, DDD (degenerative disc disease), cervical, DDD (degenerative disc disease), lumbar, Depression, General anesthetics causing adverse effect in therapeutic use, Hearing loss, Hypertension, Hypothyroidism, ROBERTO (obstructive sleep apnea), Shoulder pain, and Thyroid disease. Nicci Shannon  has a past surgical history that includes Cervical fusion; VEIN STRIPPING ; Tonsillectomy; Vaginal delivery; Total abdominal hysterectomy w/ bilateral salpingoophorectomy (Bilateral, 10/31/2019); Abdominal sacrocolpopexy (N/A, 10/31/2019); Partial vulvectomy (Bilateral, 10/31/2019); Surgical procedure for stress incontinence using tension free vaginal  tape (N/A, 10/31/2019); Cystoscopy (N/A, 10/31/2019); and Revision of vaginal cuff (N/A, 12/30/2019).  Medical Hx and Allergies:  Nicci has a current medication list which includes the following prescription(s): atorvastatin, cholecalciferol (vitamin d3), nuedexta, estradiol, fluoxetine, gabapentin, ibuprofen, losartan, pantoprazole, tiglutik, and triamcinolone acetonide 0.1%.   Review of patient's allergies indicates:   Allergen Reactions    Atropine Hallucinations     Prior Therapy:  Outpatient Speech Therapy March-September 2024                      Home Health Therapy at present time: No  Social History:  Patient lives with  in [place], Patient is currently driving.  Occupation:  retired; taught high school  Prior Level of Function: independent    Current Level of Function: mild-moderate dysphagia and dysarthria.  Nutrition: mostly puree and thin liquids. Losing weight.   Recent MBSS:  9/16/24: IMPRESSIONS: Nicci Shannon presents with mild to moderate oral/pharyngeal dysphagia. There was no aspiration on any consistency.  Penetration occurred once with thin liquid as related to swallow delay and incomplete airway closure.  Oral dysphagia is characterized by prolonged mastication, repetitive lingual transfer and reduced oral clearance.  Oral/pharyngeal clearance is improved with cueing.    Pain Scale: 0/10 on a Visual Analog Scale currently.  Pain Location: n/a  Respiratory Status: room air   Vision: glasses all the time.   Hearing: hearing loss  Hearing: Appeared to be within functional limits in conversation. Will monitor and refer as necessary.   Objective     Patient's motor speech, fluency, and voice were informally assessed. OME performed within Cranial Nerve Assessment detailed below. Motor speech skills were assessed and were not functional for daily communication with a variety of communication partners (i.e. Family, friends, medical personnel).   Respiration / Phonation:   Average  Norms  /WFL Maximum Phon. Time (ah)   P^ 9  5.0-7.1 1.8  Trial 1: 13.1   T^ 8  4.8-7.1 1.6  Trial 2:  17.7    K^ 7  4.4-7.4 1.4     P^T^K^ 4  3.6-7.5 0.8  Avg: 15.4       Norm (F 10-26, M 13-34.6)        Phonation Conversation   Stimulus Sustained ah:   History and Conversation   Quality hypernasal, dysarthria, strained, and labored hypernasal, dysarthria, strained, and labored   Duration  15.4 seconds  N/a   Loudness  Reduced Reduced   Steadiness decreased breath-speech coordination decreased breath-speech coordination     Overall Speech Intelligibility: fair with careful listening due to reduced loudness, reduced breath-speech coordination, as well as nasal, strained, and labored vocal quality.     Awareness / Strategy Use:   Patient demonstrates  adequate awareness of motor speech impairment. Pt was able to use strategies to improve intelligibility with minimal cues. Strategies introduced included: repeating herself, slow, articulate, take a large breath before speaking.    Impact of Motor Speech Impairment on Functioning:   Patient presented with decreased speech intelligibility and speech intensity which negatively impacts functional communication in most contexts, decreased QOL, and increased frustration.    Safety Risks: Decreased efficiency and effectiveness to communicate in an emergency situation.      Communicative Effectiveness Survey: is a questionnaire given to the patient to  the effectiveness of her communicative function.       Communication Situation  Rating on a scale of 1 - 4  1= not at all effective   4= very effective   Having a conversation with a family member or friends at home  3    Participating in conversation with strangers in a quiet place 1    Conversing with a familiar person over the telephone  2    Conversing with a stranger over the telephone  1    Being part of a conversation in a noisy environment (social gathering) 1    Speaking to a friend when you are emotionally  upset of you are angry 1    Having a conversation while traveling in a car 3    Having a conversation with someone at a distance (across a room) 1      Augmentative/Alternative Communication (AAC): Patient is currently using an augmentative/ alternative communication device. Currently working with [employer] on Augmentative-Alternative Communication training. Patient completed voice preservation (voice banking and message banking). Patient was interested in [employer] with [facility].  Type of AAC Device:  iPad with Predictable   Recommend AAC Eval:  n/a        Clinical Swallow Exam/ Cranial Nerve Exam:  Cranial Nerve 5: Trigeminal Nerve  Motor Jaw Posture at rest: Closed  Mandible Elevation/Depression: WFL  Mandible lateralization:  decreased ROM  Abnormal movement: absent Interpretation: unable to rule out cranial nerve involvement    Reported grinding of teeth and biting her cheek   Sensory Forehead: WFL  Cheek: WFL  Jaw: WFL  Facial Pain: None noted Interpretation: Within Functional Limits     Cranial Nerve 7: Facial Nerve  Motor Facial Symmetry:  WNL  Wrinkle Forehead: WFL  Close eyes tightly: WFL  Labial Protrusion: Decreased ROM  Labial Retraction: WFL  Labial alternating movement: Decreased ROM  Cheek puffing sustained: Decreased ROM  Cheek puffing against resistance: Decreased ROM and strength  Abnormal movement: absent Interpretation: unable to rule out cranial nerve involvement     Sensory Formal testing not completed. Pt denied any changes in taste      Cranial Nerves IX and X: Glossopharyngeal and Vagus Nerves  Motor Palatal Symmetry (Rest):  reduced tone  Palatal Symmetry: (Movement) WNL  Palatal Elevation (Movement):  Reduced elevation    Palatal Elevation (Sustained):  reduced elevation  Cough: Perceptually weak  Voice Prior to PO intake:  nasal, labored, strangled  Resonance: Hypernasal  Abnormal movement: absent Interpretation: unable to rule out cranial nerve involvement      Cranial Nerve XII: Hypoglossal Nerve  Motor Tongue at rest: fasciculations  Lingual Protrusion: Unable to protrude past lips  Lingual Protrusion against Resistance: Weakness  Lingual Lateralization: Decreased ROM  Abnormal movement: present Interpretation: unable to rule out cranial nerve involvement     Other information:  Volitional Swallow:  unable to perform volitional swallow  Mucosal Quality:  patient reported inconsistent difficulty with thick secretions  Secretion Management: drooling, recent adverse reaction to atropine drops  Dentition: Good condition for speech and mastication      EAT-10 Score:    Eating Assessment Tool (EAT-10) is a questionnaire given to the patient to  her swallowing function.     Key: 0= no problem; 4= severe problem  1. My swallowing problem has caused me to lose weight. - 4  2. My swallowing problem interferes with my ability to go out for meals. - 2  3. Swallowing liquids takes extra effort. - 4  4. Swallowing solids takes extra effort. - 4  5. Swallowing pills takes extra effort. - 3  6. Swallowing is painful. - 0  7. The pleasure of eating is affected by my swallowing. - 4  8. When I swallow food sticks in my throat. - 2  9. I cough when I eat. - 3  10. Swallowing is stressful. - 4    Katina ranked her swallowing function as a 30/40     Interpretation: Score of greater than 3 is indicative of a swallowing disorder (Pauly et al., 2008); higher scores correlate with increased penetration-aspiration  scale scores, and scores greater than 15 results in the patient being 2.2 times more likely to aspirate (Royce et al., 2015)    References:   JENNY Coats., ZACHERY Steel., DANIELLE Fernandez., LOULOU Sanders., Devin, G. N., LOULOU Walker, & AMELIA Sweeney (2008). Validity and reliability of the Eating Assessment Tool (EAT-10). Annals of Otology, Rhinology & Laryngology, 117(12), 919-924. https://[email or web address]  ZACHERY Crnae., DARIAN Frances., LOULOU Mcmillan., DARIAN Yu  RC, & JENNY Coats (2015). The ability of the 10-item eating assessment tool (EAT-10) to predict aspiration risk in persons with dysphagia. Annals of Otology, Rhinology & Laryngology, 124(5), 351-354. https://doi.org/10.1177/[email or web address]      [place] Swallow Protocol:  Pass  [place] Swallow Protocol dictates pt remain NPO if fail screener; (Jose Davidr et al. 2014) however, as objective swallow assessment is not available for greater than a week, pt will remain on current diet until objective assessment is completed unless otherwise indicated.     Clinical Swallow Examination:   Pt presented with:   THIN:- self regulated thin liquid via water bottle    PUREE:- did not test    SOLID: - did not test     DESCRIPTION: Oral Phase: The patient had no anterior loss of the bolus with adequate closure of the lips around the water bottle. No residue remained in the oral cavity following the swallow.   Pharyngeal:  Patient without overt clinical signs/symptoms of aspiration on any PO trials given. Patient's presents with a possibly inefficient swallow as indicated by multiple single swallows as a compensatory strategy. Patient reported occasional globus sensation on trials of solids, but none reported today.     Recommend Modified Barium Swallow Study (MBSS) or Fiberoptic Endoscopic Evaluation of Swallowing (FEES): No, completed on 8/22/24    Education   Patient and her family were educated on the recommendations, motor speech strategies, AAC, speech generating devices (SGD), voice banking, message banking, and swallowing precautions. They verbalized understanding of all discussed.     Assessment   Impressions: Nicci Shannon exhibited moderate to severe  mixed spastic-flaccid dysarthria c/b imprecise consonants, harsh vocal quality, slow rate of speech, short phrases, and inadequate breath- speech coordination  and strain-strangled vocal quality, hypernasality, low intensity, and labored and suspected oropharyngeal dysphagia c/b  coughing after swallows, extra effort for solids, liquids, and pills  due to ALS. Demonstrates impairments including limitations as described in the problem list. Positive prognostic factors include family support. Negative prognostic factors include progressive nature of disease. Barriers to progress include complex medical history and progressive nature of disease.     Patient's spiritual, cultural and educational needs considered and pt agreeable to plan of care and goals.    ALS Severity Scale:  Stage 3: Behavioral Modifications: Speaking rate is much slower than normal. The speaker repeats specific words in adverse listening situations and may limit the complexity or length of messages. ALS Severity Scale: 5 or 6, approaching stage 4       Rehab Potential: fair to guarded     Plan    Recommended Treatment Plan:   1.  Follow up with [facility] in 3 months   2. Consider peg tube for primary means of nutrition, pleasure oral feedings   3. Continue working with [employer] for Augmentative-Alternative Communication     Plan of care expiration: Eval Only       Other Recommendations: none at this time    Esperanza NGUYEN, [facility]  Clinician    I certify that I was present in the room directing the student in service delivery and guiding them using my skilled judgment. As the co-signing therapist I have reviewed the students documentation and am responsible for the treatment, assessment, and plan.      DIANNA Mukherjee., L-SLP,CCC-SLP, CBIS  Speech-Language Pathologist  Certified Brain Injury Specialist     2/12/2025

## 2025-02-12 NOTE — PATIENT INSTRUCTIONS
ALS Clinic After Visit Notes:  Please reach out to the clinic coordinator if you do not receive equipment / follow up calls for services that were ordered today within 1 week.      Neurology:  Due to your low blood pressure you can try holding your losartan and seeing what your blood pressure runs without it.    Start Baclofen 10mg at night for lip biting and teeth grinding.    Pulmonary:  I re-ordered the CT abdomen (plan [facility]) and the referral to radiology. Please let me know of any concerns!

## 2025-02-17 DIAGNOSIS — G12.21 ALS (AMYOTROPHIC LATERAL SCLEROSIS): Primary | ICD-10-CM

## 2025-02-17 DIAGNOSIS — R13.10 DYSPHAGIA, UNSPECIFIED TYPE: ICD-10-CM

## 2025-02-19 ENCOUNTER — DOCUMENTATION ONLY (OUTPATIENT)
Dept: NUTRITION | Facility: CLINIC | Age: 70
End: 2025-02-19
Payer: MEDICARE

## 2025-02-19 DIAGNOSIS — G12.21 ALS (AMYOTROPHIC LATERAL SCLEROSIS): Primary | ICD-10-CM

## 2025-02-19 DIAGNOSIS — R13.10 DYSPHAGIA, UNSPECIFIED TYPE: ICD-10-CM

## 2025-02-19 NOTE — PROGRESS NOTES
"MNT Feeding Tube Recommendations:     Pt will obtain adequate calories/protein via commercial standardized formula Isosource 1.5 or equivalent     Wt:  61.2 kg  Estimated calorie needs:  1830 kcal/day (30 kcal/kg)  Estimated protein needs:  61 gm protein/day (1 gm/kg )  Estimated fluid needs: 1800 ml (1 ml/kcal)     When medically able, initiate tube feeding via PEG with Isosource 1.5 (or equivalent) - bolus feeds as tolerated.  Goal is to provide 5-250 ml cartons daily + free water flushes before and after feeds.     -to establish tolerance, start with 3 cartons (1 per "meal") daily.  Advance to 5-250 ml cartons as tolerated 2 in am, 2 in afternoon, 1 in pm.  Flush tube with 60 ml water before and after each feed.     5-250 ml cartons Isosource 1.5 daily will provide 1875 kcal, 85 gm protein, 19.0 gm dietary fiber, 955 ml free water + additional water from flushes.     Goal:  Pt will receive adequate nutrition to meet assessed needs within 48-72 hours of initiating tube feeding.     The home health agency will provide education on site care, enteral nutrition administration, etc; the vendor (ie [facility]) will provide actual enteral nutrition formula.    "

## 2025-03-17 ENCOUNTER — PATIENT MESSAGE (OUTPATIENT)
Dept: NEUROLOGY | Facility: CLINIC | Age: 70
End: 2025-03-17
Payer: MEDICARE

## 2025-03-18 DIAGNOSIS — G12.21 ALS (AMYOTROPHIC LATERAL SCLEROSIS): Primary | ICD-10-CM

## 2025-03-18 DIAGNOSIS — R13.10 DYSPHAGIA, UNSPECIFIED TYPE: ICD-10-CM

## 2025-03-27 ENCOUNTER — TELEPHONE (OUTPATIENT)
Dept: INTERVENTIONAL RADIOLOGY/VASCULAR | Facility: CLINIC | Age: 70
End: 2025-03-27
Payer: MEDICARE

## 2025-04-02 ENCOUNTER — TELEPHONE (OUTPATIENT)
Dept: CRITICAL CARE MEDICINE | Facility: HOSPITAL | Age: 70
End: 2025-04-02
Payer: MEDICARE

## 2025-04-02 ENCOUNTER — TELEPHONE (OUTPATIENT)
Dept: INTERVENTIONAL RADIOLOGY/VASCULAR | Facility: CLINIC | Age: 70
End: 2025-04-02
Payer: MEDICARE

## 2025-04-02 DIAGNOSIS — R13.10 DYSPHAGIA, UNSPECIFIED TYPE: ICD-10-CM

## 2025-04-02 DIAGNOSIS — G12.21 ALS (AMYOTROPHIC LATERAL SCLEROSIS): Primary | ICD-10-CM

## 2025-04-02 NOTE — TELEPHONE ENCOUNTER
Spoke to patients Daughter on the phone. CT reviewed by Dr. Hardwick from 3/25/2025. There is no safe window for us to proceed with G-Tube placement under IR guidance. Recommend general surgery referral for placement.    Malinda Miller PA-C  Interventional Radiology  [phone]

## 2025-04-02 NOTE — TELEPHONE ENCOUNTER
Given inadequate window for IR gastrostomy, will refer to general surgery for PEG vs laparoscopic placement. Her respiratory mm strength and cough are intact so she should tolerate general anesthesia well as needed.    Suman Ambrocio MD

## 2025-04-04 ENCOUNTER — OFFICE VISIT (OUTPATIENT)
Dept: SURGERY | Facility: CLINIC | Age: 70
End: 2025-04-04
Payer: MEDICARE

## 2025-04-04 VITALS
HEIGHT: 65 IN | SYSTOLIC BLOOD PRESSURE: 165 MMHG | DIASTOLIC BLOOD PRESSURE: 72 MMHG | BODY MASS INDEX: 21.52 KG/M2 | HEART RATE: 61 BPM | WEIGHT: 129.19 LBS | OXYGEN SATURATION: 96 %

## 2025-04-04 DIAGNOSIS — G12.21 ALS (AMYOTROPHIC LATERAL SCLEROSIS): Primary | ICD-10-CM

## 2025-04-04 DIAGNOSIS — R13.10 DYSPHAGIA, UNSPECIFIED TYPE: ICD-10-CM

## 2025-04-04 PROCEDURE — 99999 PR PBB SHADOW E&M-EST. PATIENT-LVL IV: CPT | Mod: PBBFAC,,, | Performed by: STUDENT IN AN ORGANIZED HEALTH CARE EDUCATION/TRAINING PROGRAM

## 2025-04-04 PROCEDURE — 99204 OFFICE O/P NEW MOD 45 MIN: CPT | Mod: S$PBB,,, | Performed by: STUDENT IN AN ORGANIZED HEALTH CARE EDUCATION/TRAINING PROGRAM

## 2025-04-04 PROCEDURE — 99214 OFFICE O/P EST MOD 30 MIN: CPT | Mod: PBBFAC | Performed by: STUDENT IN AN ORGANIZED HEALTH CARE EDUCATION/TRAINING PROGRAM

## 2025-04-04 NOTE — PROGRESS NOTES
General Surgery Office Visit   History and Physical    Patient Name: Nicci Shannon  YOB: 1955 (70 y.o.)  MRN: [id number]  Today's Date: 04/04/2025    Referring Md:   Suman Ambrocio Md  [address] [facility]  [address] [place] [id number]    SUBJECTIVE:     Chief Complaint: Difficulty swallowing secondary to ALS.    History of Present Illness:  Nicci Shannon is a 70 y.o. female with PMHx of  ALS, HTN, DDD,   who presents to the clinic today as a referral for PEG tube placement for enteral nutrition. She was initially referred to interventional radiology who was unable to find an adequate window for placement and was then referred to this clinic. She denies n/v, fever, chills, diarrhea, or constipation. She has no personal history of DM, CAD, or cancer and her pulmonologist feels she will be able to handle general anesthesia without any adverse outcomes. No additional concerns or complaints at this time.    Review of patient's allergies indicates:   Allergen Reactions    Atropine Hallucinations       Past Medical History:   Diagnosis Date    Bursitis     right hip    DDD (degenerative disc disease), cervical     DDD (degenerative disc disease), lumbar     Depression     General anesthetics causing adverse effect in therapeutic use     Hearing loss     Hypertension     Hypothyroidism     ROBERTO (obstructive sleep apnea)     does not use cpap    Shoulder pain     left    Thyroid disease      Past Surgical History:   Procedure Laterality Date    ABDOMINAL SACROCOLPOPEXY N/A 10/31/2019    Procedure: SACROCOLPOPEXY, ABDOMINAL;  Surgeon: Reagan Maldonado MD;  Location: [facility] OR;  Service: OB/GYN;  Laterality: N/A;    CERVICAL FUSION      CYSTOSCOPY N/A 10/31/2019    Procedure: CYSTOSCOPY;  Surgeon: Reagan Maldonado MD;  Location: [facility] OR;  Service: OB/GYN;  Laterality: N/A;    PARTIAL VULVECTOMY Bilateral 10/31/2019    Procedure: VULVECTOMY, PARTIAL - Labial Mass Resection;  Surgeon: Reagan FARMER  "MD Joel;  Location: [facility] OR;  Service: OB/GYN;  Laterality: Bilateral;    REVISION OF VAGINAL CUFF N/A 12/30/2019    Procedure: REVISION, VAGINAL CUFF;  Surgeon: Reagan Maldonado MD;  Location: [facility] OR;  Service: OB/GYN;  Laterality: N/A;    SURGICAL PROCEDURE FOR STRESS INCONTINENCE USING TENSION FREE VAGINAL TAPE N/A 10/31/2019    Procedure: SURGICAL PROCEDURE, USING TENSION FREE VAGINAL TAPE, FOR STRESS INCONTINENCE;  Surgeon: Reagan Maldonado MD;  Location: [facility] OR;  Service: OB/GYN;  Laterality: N/A;    TONSILLECTOMY      TOTAL ABDOMINAL HYSTERECTOMY W/ BILATERAL SALPINGOOPHORECTOMY Bilateral 10/31/2019    Procedure: HYSTERECTOMY, TOTAL, ABDOMINAL, WITH BILATERAL SALPINGO-OOPHORECTOMY;  Surgeon: Reagan Maldonado MD;  Location: [facility] OR;  Service: OB/GYN;  Laterality: Bilateral;    VAGINAL DELIVERY      x2    VEIN STRIPPING        Family History   Problem Relation Name Age of Onset    Heart disease Mother      Stroke Father       Social History[1]     Review of Systems:  Review of Systems   Constitutional:  Positive for weight loss. Negative for chills and fever.   Cardiovascular:  Positive for chest pain.   Gastrointestinal:  Negative for abdominal pain, constipation, diarrhea, nausea and vomiting.   All other systems reviewed and are negative.      OBJECTIVE:     Vital Signs (Most Recent)  BP (!) 165/72 (BP Location: Right arm, Patient Position: Sitting)   Pulse 61   Ht 5' 5" (1.651 m)   Wt 58.6 kg (129 lb 3 oz)   SpO2 96%   BMI 21.50 kg/m²     Physical Exam  Constitutional:       Appearance: Normal appearance.   HENT:      Head: Normocephalic and atraumatic.   Cardiovascular:      Rate and Rhythm: Normal rate.   Pulmonary:      Effort: Pulmonary effort is normal. No respiratory distress.   Abdominal:      General: Abdomen is flat.      Palpations: Abdomen is soft.   Skin:     General: Skin is warm and dry.   Neurological:      Mental Status: She is alert.      Comments: Dsyarthria due to " ALS           Labs:     Lab Results   Component Value Date    WBC 7.30 02/12/2025    HGB 11.5 (L) 02/12/2025    HCT 37.1 02/12/2025    MCV 93 02/12/2025     02/12/2025         CMP  Sodium   Date Value Ref Range Status   02/12/2025 138 136 - 145 mmol/L Final     Potassium   Date Value Ref Range Status   02/12/2025 5.4 (H) 3.5 - 5.1 mmol/L Final     Comment:     *No Visible Hemolysis     Chloride   Date Value Ref Range Status   02/12/2025 102 95 - 110 mmol/L Final     CO2   Date Value Ref Range Status   02/12/2025 25 23 - 29 mmol/L Final     Glucose   Date Value Ref Range Status   02/12/2025 84 70 - 110 mg/dL Final     BUN   Date Value Ref Range Status   02/12/2025 18 8 - 23 mg/dL Final     Creatinine   Date Value Ref Range Status   02/12/2025 0.9 0.5 - 1.4 mg/dL Final     Calcium   Date Value Ref Range Status   02/12/2025 9.7 8.7 - 10.5 mg/dL Final     Total Protein   Date Value Ref Range Status   02/12/2025 6.8 6.0 - 8.4 g/dL Final     Albumin   Date Value Ref Range Status   02/12/2025 3.6 3.5 - 5.2 g/dL Final     Total Bilirubin   Date Value Ref Range Status   02/12/2025 0.5 0.1 - 1.0 mg/dL Final     Comment:     For infants and newborns, interpretation of results should be based  on gestational age, weight and in agreement with clinical  observations.    Premature Infant recommended reference ranges:  Up to 24 hours.............<8.0 mg/dL  Up to 48 hours............<12.0 mg/dL  3-5 days..................<15.0 mg/dL  6-29 days.................<15.0 mg/dL       Alkaline Phosphatase   Date Value Ref Range Status   02/12/2025 65 40 - 150 U/L Final     AST   Date Value Ref Range Status   02/12/2025 20 10 - 40 U/L Final     ALT   Date Value Ref Range Status   02/12/2025 15 10 - 44 U/L Final     Anion Gap   Date Value Ref Range Status   02/12/2025 11 8 - 16 mmol/L Final     eGFR if    Date Value Ref Range Status   10/29/2019 >60 >60 mL/min/1.73 m^2 Final     eGFR if non    Date  Value Ref Range Status   10/29/2019 >60 >60 mL/min/1.73 m^2 Final     Comment:     Calculation used to obtain the estimated glomerular filtration  rate (eGFR) is the CKD-EPI equation.            ASSESSMENT/PLAN:     There are no diagnoses linked to this encounter.    Nicci Shannon is a 70 y.o. female with pmhx significant for ALS, HTN, and DDD who is presenting as a referral for placement of gastrostomy tube for enteral nutrition. We discussed the risk and benefits of this procedure and patient demonstrated understanding and is amenable with the plan.      - Schedule for PEG vs Lap assisted G tube placement vs open G tube placement   - consent obtained in clinic   - date to be scheduled with staff.  - Patient instructed to call clinic with any questions, concerns, or new symptoms  - Patient understands and in agreement with plan; all questions answered    Case discussed with Dr. Capellan.      Casimiro Mendez MD  [facility]  General Surgery PGY-1            [1]   Social History  Tobacco Use    Smoking status: Former     Current packs/day: 0.00     Average packs/day: 0.5 packs/day for 4.0 years (2.0 ttl pk-yrs)     Types: Cigarettes     Start date: 1972     Quit date: 1976     Years since quittin.2    Smokeless tobacco: Never   Substance Use Topics    Alcohol use: Not Currently    Drug use: Never

## 2025-04-07 DIAGNOSIS — G12.21 ALS (AMYOTROPHIC LATERAL SCLEROSIS): Primary | ICD-10-CM

## 2025-05-05 ENCOUNTER — PATIENT MESSAGE (OUTPATIENT)
Dept: SURGERY | Facility: CLINIC | Age: 70
End: 2025-05-05
Payer: MEDICARE

## 2025-05-06 NOTE — PROGRESS NOTES
[facility]      SPEECH THERAPY NEUROLOGICAL REHABILITATION EVALUATION    ALS MULTIDISCIPLINARY CLINIC     Date: 5/7/2025    Name: Nicci Shannon   MRN: [id number]    Therapy Diagnosis:   Encounter Diagnoses   Name Primary?    ALS (amyotrophic lateral sclerosis)     Dysphagia, unspecified type Yes    Dysarthria      Physician: Dr. Lena Virk  Physician Orders: Ambulatory Referral for Speech  Plan of Care Expiration: Evaluation Only  Medical Diagnosis: ALS    Initial Clinic Evaluation Date: 10/30/2024  ALS Clinic Number: [id number]    Time In: 10:06 am  Time Out: 10:30 am  Procedure      Swallow and Oral Function Evaluation      Precautions: progressive degenerative disease, Standard, Communication, Dysphagia, and Aspiration    Subjective   Date of Onset:  Symptoms began in 2023  History of Current Condition: Nicci Shannon  presents to the [facility] secondary to the diagnosis of ALS Disease. New medical changes since last visit include decline in speech intelligibility, decline in breath-speech coordination, and difficulty swallowing.  Has Predictable artemio. Connected with Team [place] for future communication needs.  Chief Complaint: speech clarity, running out of air when talking, and difficulty swallowing  Past Medical History:  has a past medical history of Bursitis, DDD (degenerative disc disease), cervical, DDD (degenerative disc disease), lumbar, Depression, General anesthetics causing adverse effect in therapeutic use, Hearing loss, Hypertension, Hypothyroidism, ROBERTO (obstructive sleep apnea), Shoulder pain, and Thyroid disease. Nicci Shannon  has a past surgical history that includes Cervical fusion; VEIN STRIPPING ; Tonsillectomy; Vaginal delivery; Total abdominal hysterectomy w/ bilateral salpingoophorectomy (Bilateral, 10/31/2019); Abdominal sacrocolpopexy (N/A, 10/31/2019); Partial vulvectomy (Bilateral, 10/31/2019); Surgical  procedure for stress incontinence using tension free vaginal tape (N/A, 10/31/2019); Cystoscopy (N/A, 10/31/2019); and Revision of vaginal cuff (N/A, 12/30/2019).  Medical Hx and Allergies:  Nicci has a current medication list which includes the following prescription(s): baclofen, cholecalciferol (vitamin d3), nuedexta, estradiol, fluoxetine, gabapentin, ibuprofen, losartan, pantoprazole, pravastatin, and tiglutik.   Review of patient's allergies indicates:   Allergen Reactions    Atropine Hallucinations     Prior Therapy:  Outpatient Speech Therapy March-September 2024                      Home Health Therapy at present time: No  Social History:  Patient lives with  in [place], Patient is currently driving.  Occupation:  retired; taught high school  Prior Level of Function: independent    Current Level of Function: severe dysphagia and dysarthria.  Nutrition: mostly puree and thin liquids. Losing weight.   Recent MBSS:  9/16/24: IMPRESSIONS: Nicci Shannon presents with mild to moderate oral/pharyngeal dysphagia. There was no aspiration on any consistency.  Penetration occurred once with thin liquid as related to swallow delay and incomplete airway closure.  Oral dysphagia is characterized by prolonged mastication, repetitive lingual transfer and reduced oral clearance.  Oral/pharyngeal clearance is improved with cueing.    Pain Scale: 0/10 on a Visual Analog Scale currently.  Pain Location: n/a  Respiratory Status: room air   Vision: glasses all the time.   Hearing: hearing loss  Hearing: Appeared to be within functional limits in conversation. Will monitor and refer as necessary.   Objective     Patient's motor speech, fluency, and voice were informally assessed. OME performed within Cranial Nerve Assessment detailed below. Motor speech skills were assessed and were not functional for daily communication with a variety of communication partners (i.e. Family, friends, medical personnel).   Respiration  / Phonation:   Average Norms  /WFL Maximum Phon. Time (ah)   P^ DNT 5.0-7.1  Trial 1: DNT   T^ DNT 4.8-7.1  Trial 2:  DNT    K^ DNT 4.4-7.4     P^T^K^ DNT 3.6-7.5  Avg:        Norm (F 10-26, M 13-34.6)        Conversation   Stimulus History and Conversation   Quality hypernasal, dysarthria, strained, and labored   Duration  N/a   Loudness  Reduced   Steadiness decreased breath-speech coordination     Overall Speech Intelligibility: POOR; reduced articulation reduced breath-speech coordination, as well as nasal, strained, and labored vocal quality.     Awareness / Strategy Use:   Patient demonstrates  adequate awareness of motor speech impairment. Pt was able to use strategies; don't help much to improve intelligibility. Strategies introduced included: repeating herself, slow, articulate, take a large breath before speaking.    Impact of Motor Speech Impairment on Functioning:   Patient presented with decreased speech intelligibility and speech intensity which negatively impacts functional communication in most contexts, decreased QOL, and increased frustration.    Safety Risks: Decreased efficiency and effectiveness to communicate in an emergency situation.      Communicative Effectiveness Survey: is a questionnaire given to the patient to  the effectiveness of her communicative function.     Augmentative/Alternative Communication (AAC): Patient is currently using an augmentative/ alternative communication artemio; Predictable. Currently working with [employer] [place] on Augmentative-Alternative Communication training. Patient completed voice preservation (voice banking and message banking). Patient was interested in Video Legacy Banking with [facility].  Type of AAC Device:  iPad with Predictable   Recommend AAC Eval: n/a        Clinical Swallow Exam/ Cranial Nerve Exam:  Cranial Nerve 5: Trigeminal Nerve  Motor Jaw Posture at rest: Closed  Mandible Elevation/Depression: WFL  Mandible lateralization:  decreased ROM  Abnormal movement: absent Interpretation: unable to rule out cranial nerve involvement    Reported grinding of teeth and biting her cheek   Sensory Forehead: WFL  Cheek: WFL  Jaw: WFL  Facial Pain: None noted Interpretation: Within Functional Limits     Cranial Nerve 7: Facial Nerve  Motor Facial Symmetry:  WNL  Wrinkle Forehead: WFL  Close eyes tightly: WFL  Labial Protrusion: Decreased ROM  Labial Retraction: WFL  Labial alternating movement: Decreased ROM  Cheek puffing sustained: Decreased ROM  Cheek puffing against resistance: Decreased ROM and strength  Abnormal movement: absent Interpretation: unable to rule out cranial nerve involvement     Sensory Formal testing not completed. Pt denied any changes in taste      Cranial Nerves IX and X: Glossopharyngeal and Vagus Nerves  Motor Palatal Symmetry (Rest): reduced tone  Palatal Symmetry: (Movement) WNL  Palatal Elevation (Movement): Reduced elevation   Palatal Elevation (Sustained): reduced elevation  Cough: Perceptually weak  Voice Prior to PO intake: nasal, labored, strangled  Resonance: Hypernasal  Abnormal movement: absent Interpretation: unable to rule out cranial nerve involvement     Cranial Nerve XII: Hypoglossal Nerve  Motor Tongue at rest: fasciculations  Lingual Protrusion: Unable to protrude past lips  Lingual Protrusion against Resistance: Weakness  Lingual Lateralization: Decreased ROM  Abnormal movement: present Interpretation: unable to rule out cranial nerve involvement     Other information:  Volitional Swallow: unable to perform volitional swallow  Mucosal Quality: patient reported inconsistent difficulty with thick secretions  Secretion Management: thick secretions.  Dentition: Good condition for speech and mastication      EAT-10 Score:    Eating Assessment Tool (EAT-10) is a questionnaire given to the patient to  her swallowing function.     Key: 0= no problem; 4= severe problem  1. My swallowing problem has caused me to  lose weight. - 3  2. My swallowing problem interferes with my ability to go out for meals. - 4  3. Swallowing liquids takes extra effort. - 3  4. Swallowing solids takes extra effort. - 4  5. Swallowing pills takes extra effort. - 3  6. Swallowing is painful. - 0  7. The pleasure of eating is affected by my swallowing. - 4  8. When I swallow food sticks in my throat. - 3  9. I cough when I eat. - 3  10. Swallowing is stressful. - 3    Katina ranked her swallowing function as a 30/40     Interpretation: Score of greater than 3 is indicative of a swallowing disorder (Pauly et al., 2008); higher scores correlate with increased penetration-aspiration  scale scores, and scores greater than 15 results in the patient being 2.2 times more likely to aspirate (Royce et al., 2015)    References:   JENNY Coats., ZACHERY Steel., DANIELLE Fernandez., LOULOU Sanders., Devin, PASQUALE GRAVES., LOULOU Walker, & AMELIA Sweeney. (2008). Validity and reliability of the Eating Assessment Tool (EAT-10). Annals of Otology, Rhinology & Laryngology, 117(12), 919-924. [email or web address]  ZACHERY Crane., DARIAN Frances., LOULOU Mcmillan., Aimee, M. A., & JENNY Coats. (2015). The ability of the 10-item eating assessment tool (EAT-10) to predict aspiration risk in persons with dysphagia. Annals of Otology, Rhinology & Laryngology, 124(5), 351-354. https://[email or web address]      [place] Swallow Protocol:  DID NOT TEST  [place] Swallow Protocol dictates pt remain NPO if fail screener; (Jose Davidr et al. 2014) however, as objective swallow assessment is not available for greater than a week, pt will remain on current diet until objective assessment is completed unless otherwise indicated.     Clinical Swallow Examination:   Pt presented with:   THIN:- self regulated thin liquid via water bottle    PUREE:- X 2    SOLID: - did not test     DESCRIPTION: Oral Phase: The patient had no anterior loss of the bolus with adequate closure of the lips  around the water bottle. No residue remained in the oral cavity following the swallow.   Pharyngeal:  Patient without overt clinical signs/symptoms of aspiration on any PO trials given. Patient's presents with a possibly inefficient swallow as indicated by multiple single swallows as a compensatory strategy. Patient reported occasional globus sensation on trials of solids, but none reported today.  Patient's swallow is extremely slow and effortful using a lot of energy.     Pureed food at home.  Getting PEG tube soon.  Dietician is putting in recommendations for PEG tube.    Recommend Modified Barium Swallow Study (MBSS) or Fiberoptic Endoscopic Evaluation of Swallowing (FEES): No, completed on 8/22/24    Education   Patient and her family were educated on the recommendations, motor speech strategies, AAC, speech generating devices (SGD), voice banking, message banking, and swallowing precautions. They verbalized understanding of all discussed.     Assessment   Impressions: iNcci Shannon exhibited moderate to severe  mixed spastic-flaccid dysarthria c/b imprecise consonants, harsh vocal quality, slow rate of speech, short phrases, and inadequate breath- speech coordination  and strain-strangled vocal quality, hypernasality, low intensity, and labored and suspected oropharyngeal dysphagia c/b coughing after swallows, extra effort for solids, liquids, and pills  due to ALS. Demonstrates impairments including limitations as described in the problem list. Positive prognostic factors include family support. Negative prognostic factors include progressive nature of disease. Barriers to progress include complex medical history and progressive nature of disease.     Patient's spiritual, cultural and educational needs considered and pt agreeable to plan of care and goals.    ALS Severity Scale:  Stage 3: Behavioral Modifications: Speaking rate is much slower than normal. The speaker repeats specific words in adverse  listening situations and may limit the complexity or length of messages. ALS Severity Scale: 5 or 6, approaching stage 4       Rehab Potential: fair to guarded     Plan    Recommended Treatment Plan:   1.  Follow up with [facility] in 3 months   2. Consider peg tube for primary means of nutrition, pleasure oral feedings   3. Continue working with [facility] [place] for Augmentative-Alternative Communication     Plan of care expiration: Eval Only     Other Recommendations: none at this time    Arcelia Robles M.S., L-SLP,CCC-SLP   Speech Language Pathologist      5/7/2025

## 2025-05-06 NOTE — PROGRESS NOTES
"[facility]   Physical Therapy Re-Evaluation     at [facility]     Date: 5/7/2025    Name: Nicci Shannon   MRN: [id number]    Therapy Diagnosis:   Encounter Diagnoses   Name Primary?    ALS (amyotrophic lateral sclerosis)     Impaired functional mobility, balance, gait, and endurance Yes        Physician: Dr. Lena Virk  Physician Orders: Ambulatory Referral for Physical Therapy at ALS Clinic  Plan of Care Expiration: Re-evaluation only  Medical Diagnosis: ALS (G12.21)      Initial Clinic Date: 10/30/2024   ALS Clinic Number: #3     Time In: 08:41  Time Out: 09:00  Total Billable Time: 19 minutes    Precautions: Standard and Fall, Progressive neurological diease   SUBJECTIVE   (5/7/2025) Patient reports: the left hand is getting weaker. Right hand is still holding up okay and she can still use it for handwriting. No other big changes. She also notes that her left foot is slightly weaker but she still doesn't feel like she needs a brace.     (2/12/25) Patient reports: she feels like things are pretty good. Her speech and swallowing has gotten a little worse but otherwise doing well. She is still walking and playing piano, but she does endorse issues with fine motor tasks.      (10/30/24) Patient reports: she notices weakness in her hands. Her legs aren't as strong but she is grateful for how strong they are. She has had a few falls (see below for details). She does note that getting up off of low surfaces are more effortful. Her  has to help her more around the house. She is having more issues with fine motor, especially buttons.     History of Present Illness: Nicci is a 70 y.o. female that presents to [facility] secondary to dx of ALS. Denies new medical changes since last clinic visit    From MD note: "Nicci Shannon is a 69 y.o. female referred for consultation regarding speech and swallowing changes. She noticed she lost in " her singing voice last fall.  She had to quit the choir in December because of this.  She had no vocal control when singing, wobbling of the voice.  She had no shortness of breath when singing.  Then she started to notice her tongue was becoming lazy and the quality of her speech changed.  She started speech therapy around Feb/March of 2024, but she isn't sure it has helped though she seemed to stabilize.  After being off ST for a couple months she wants to go back. Slowly she started to notice issues with swallowing.  This seemed to get progressively worse as well.  She has to eat a mechanical diet, otherwise she feels things will get stuck.  She will get choked up sipping on water.  She is living on soup.  She has to be careful, even this can cause her to cough or have to clear her throat.  She sometimes feels it hits the back of her throat before she is ready for it. She feels that her cough is pretty strong.  She had been following with ENT a few months prior to this and she picked up on the speech changes.  She ordered a neurology referral and speech therapy referral. She was seen by neurology at [place].  They did an EMG and some blood work for Sjogren's.  She also had an MRI of her neck.  She was seen by rheumatology at [facility]. They did more blood work.  Overall her workup thus far was fairly unremarkable.  Rheumatology suggested back to neurology, but was willing to do one other test.  She was referred back to ENT for a bx of the salivary gland.  She is scheduled for this next week at [place].  She has no significant dry eye symptoms but does use eye drops here and there. She never has burning or excessive tearing.  She states she has excessive saliva at night, even during the day she has to wipe her saliva.  She also feels her left face droops some.  She feels her saliva is thick at times, but she does not feel like she has dry mouth. She denies any weight loss.  She has not had shortness of breath.  " She had a bad fall last year, but no falls lately. She has stumbled a few times, she attributes to not picking up her feet or feeling a little disequilibrium.  This is chronic, but seems more evident now.  She has no double vision or drooping eyelids. There is no fluctuation in her symptoms.  She has no family history of any neurological conditions. No preceding illness or infection. "     Medical History:   Past Medical History:   Diagnosis Date    Bursitis     right hip    DDD (degenerative disc disease), cervical     DDD (degenerative disc disease), lumbar     Depression     General anesthetics causing adverse effect in therapeutic use     Hearing loss     Hypertension     Hypothyroidism     ROBERTO (obstructive sleep apnea)     does not use cpap    Shoulder pain     left    Thyroid disease      Surgical History:   Nicci Shannon  has a past surgical history that includes Cervical fusion; VEIN STRIPPING ; Tonsillectomy; Vaginal delivery; Total abdominal hysterectomy w/ bilateral salpingoophorectomy (Bilateral, 10/31/2019); Abdominal sacrocolpopexy (N/A, 10/31/2019); Partial vulvectomy (Bilateral, 10/31/2019); Surgical procedure for stress incontinence using tension free vaginal tape (N/A, 10/31/2019); Cystoscopy (N/A, 10/31/2019); and Revision of vaginal cuff (N/A, 12/30/2019).  Medications:   Nicci has a current medication list which includes the following prescription(s): baclofen, cholecalciferol (vitamin d3), nuedexta, estradiol, fluoxetine, gabapentin, ibuprofen, losartan, pantoprazole, pravastatin, and tiglutik.  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   Exercise Routine / History: none, walks her daughter's dog   Current Level of Function: independence to modified independence    Falls: none   Near falls: none     Pain:  Current 0/10, worst 0/10, best 0/10   Location: none reported    Pt's goals: Assess for PT needs related to ALS    OBJECTIVE   Patient presents to clinic: walks    Mental status: alert, oriented to person, place, and time, normal mood, behavior, speech, dress, motor activity, and thought processes  Appearance: Casually dressed  Behavior:  calm and cooperative  Attention Span and Concentration: Normal    Posture Alignment: [] forward head  [] forward/rounded shoulders  [] head drop   [] increased thoracic kyphosis  [] sacral sitting  [] unremarkable    Lower Extremity ROM [x] AROM WNL [x] PROM WNL     Lower Extremity Strength  Right LE 10/30/24 2/12/25 5/7/25 Left LE 10/30/24 2/12/25 5/7/25   Hip flexion: 4+/5 4/5 4-/5 Hip flexion: 4+/5 4/5 4/5   Hip extension:  4+/5 4+/5 4+/5 Hip extension: 4+/5 4+/5 4+/5   Hip abduction: 5/5 5/5 5/5 Hip abduction: 5/5 5/5 5/5   Hip adduction: 5/5 5/5 5/5 Hip adduction 5/5 5/5 5/5   Knee extension: 5/5 5/5 5/5 Knee extension: 5/5 5/5 5/5   Knee flexion: 5/5 5/5 5/5 Knee flexion: 5/5 5/5 5/5   Ankle dorsiflexion: 5/5 4/5 4/5 Ankle dorsiflexion: 4+/5 4-/5 3+/5   Ankle plantarflexion: 5/5 5/5 5/5 Ankle plantarflexion: 5/5 5/5 5/5        Tone:  [] hypotonic [] hypertonic/spastic   [] fasciculations observed  Limbs/muscles affected: [] neck musculature  [] trunk [] right arm  [] left arm  [] right leg  [] left leg      Edema: [] dependent edema of bilateral lower extremities   [] pitting [] non-pitting  [x] none observed today     Coordination:   - fine motor: see OT note  - UE coordination: see OT note  - LE coordination: NOT TESTED      Initial Evaluation  Re-Doris 2/12/25  Re-Doris  5/7/25     5x Chair Rise Test  15.19 seconds with arms on legs 11.72 seconds 16.35 seconds without arms   Functional Reach Sitting 12+ inches 12+ inches 12+ inches   Functional Reach Standing 12 inches 9 inches 8.5 inches   Activities Balance Confidence (ABC) scale  89.375% 85.625% 83.125%        Sitting balance static: good  Sitting balance dynamic: good  Standing balance static: good  Standing balance dynamic:  good    Gait:   [] patient is no longer ambulatory   - AD used: No Assistive Device  - Assistance: independence  - Distance: community distances  - Deviations: Normal, does not require assistive devices, decreased toe-to-floor clearance     Endurance Deficit: mild    Functional Mobility (Bed mobility, transfers)  Bed mobility: independence  Supine to sit: independence  Sit to supine: independence  Transfers to bed: independence  Transfers to toilet: independence  Tub/shower transfers: independence  Sit to stand:  modified independence when tired  Stand pivot: modified independence when tired  Car transfers: independence  Wheelchair mobility: N/A      Treatment    No Treatment Provided today.     Education Provided     Education provided re: POC, Educated pt on purpose of PT services now and in the future to assist with mobility training and adaptation education as the ALS disease process progresses. Nicci verbalized good understanding of education provided. Pt has no cultural, educational or language barriers to learning provided.    Written Home Exercises Provided: none provided today    ASSESSMENT    Nicci is a 70 y.o. female referred to outpatient Physical Therapy with a medical diagnosis of ALS. Pt presents with very minor changes in lower extremity strength, mostly noted weakness at the left ankle. Otherwise, she remains independent with gait-related mobility tasks and is able to walk her daughter's dog without any issues. She does appear to have some increased endurance deficits as noted by  her 5x STS. PT and OT reinforced energy conservation techniques at this time to encourage more rest breaks in order to allow her to complete other enjoyable tasks. No formal PT needs at this time.     Equipment recommendations:    AFO(s) left - declined at this time but showed daughter how to self-purchase when ready     Order/referral recommendations:    none         Pt prognosis is Fair.     Plan of care discussed with patient: Yes  Pt's spiritual, cultural and educational needs considered and patient is agreeable to the plan of care.    Anticipated Barriers for therapy: Progressive nature of ALS     Plan   Plan of care Certification: 5/7/2025 ReEvaluation Only    Pt to continue to follow up with referring provider and ALS clinic at physician recommended intervals.       Oly Nielsen, PT

## 2025-05-07 ENCOUNTER — CLINICAL SUPPORT (OUTPATIENT)
Dept: REHABILITATION | Facility: HOSPITAL | Age: 70
End: 2025-05-07
Attending: PSYCHIATRY & NEUROLOGY
Payer: MEDICARE

## 2025-05-07 DIAGNOSIS — Z78.9 ALTERATION IN INSTRUMENTAL ACTIVITIES OF DAILY LIVING (IADL): Primary | ICD-10-CM

## 2025-05-07 DIAGNOSIS — G12.21 ALS (AMYOTROPHIC LATERAL SCLEROSIS): ICD-10-CM

## 2025-05-07 DIAGNOSIS — Z74.09 IMPAIRED FUNCTIONAL MOBILITY, BALANCE, GAIT, AND ENDURANCE: Primary | ICD-10-CM

## 2025-05-07 DIAGNOSIS — R47.1 DYSARTHRIA: ICD-10-CM

## 2025-05-07 DIAGNOSIS — R13.10 DYSPHAGIA, UNSPECIFIED TYPE: Primary | ICD-10-CM

## 2025-05-07 PROCEDURE — 97168 OT RE-EVAL EST PLAN CARE: CPT

## 2025-05-07 PROCEDURE — 92610 EVALUATE SWALLOWING FUNCTION: CPT

## 2025-05-07 PROCEDURE — 97164 PT RE-EVAL EST PLAN CARE: CPT

## 2025-05-07 NOTE — PROGRESS NOTES
[facility]   Occupational Therapy Evaluation     at [facility]     Date: 5/7/2025  Name: Nicci Shannon   MRN: [id number]      Therapy Diagnosis:   Encounter Diagnoses   Name Primary?    ALS (amyotrophic lateral sclerosis)     Alteration in instrumental activities of daily living (IADL) Yes     Physician: Dr Virk  Physician Orders: Ambulatory Referral for Occupational Therapy at ALS Clinic  Plan of Care Expiration: Evaluation Only  Medical Diagnosis: ALS (G12.21)     Initial Clinic Date: 10/30/24  ALS Clinic Number: 3    Time In: 0841  Time Out: 0900  Total Billable Time: 9.5 minutes     Precautions: Standard, Fall    SUBJECTIVE     Patient reports:  increased weakness on left side in hand and left foot (denied AFO). But maintains level of independence. Feels like left hand is getting slower.    History of Present Illness: Nicci is a 70 y.o. female that presents to [facility] secondary to dx of ALS. Pt was first diagnosed 2024. Symptom onset was Fall of 2023. Difficulty speaking/singing. Now having issues with swallowing.     From MD Note:  [facility] 8/22/24:  Nicci Shannon is a 69 y.o. female referred for consultation regarding speech and swallowing changes.  She noticed she lost in her singing voice last fall.  She had to quit the choir in December because of this.  She had no vocal control when singing, wobbling of the voice.  She had no shortness of breath when singing.  Then she started to notice her tongue was becoming lazy and the quality of her speech changed.  She started speech therapy around Feb/March of 2024, but she isn't sure it has helped though she seemed to stabilize.  After being off ST for a couple months she wants to go back.  Slowly she started to notice issues with swallowing.  This seemed to get progressively worse as well.  She has to eat a mechanical diet, otherwise she feels things will get stuck.  She will  get choked up sipping on water.  She is living on soup.  She has to be careful, even this can cause her to cough or have to clear her throat.  She sometimes feels it hits the back of her throat before she is ready for it. She feels that her cough is pretty strong.        Medical History:   Past Medical History:   Diagnosis Date    Bursitis     right hip    DDD (degenerative disc disease), cervical     DDD (degenerative disc disease), lumbar     Depression     General anesthetics causing adverse effect in therapeutic use     Hearing loss     Hypertension     Hypothyroidism     ROBERTO (obstructive sleep apnea)     does not use cpap    Shoulder pain     left    Thyroid disease      Surgical History:   Nicci Shannon  has a past surgical history that includes Cervical fusion; VEIN STRIPPING ; Tonsillectomy; Vaginal delivery; Total abdominal hysterectomy w/ bilateral salpingoophorectomy (Bilateral, 10/31/2019); Abdominal sacrocolpopexy (N/A, 10/31/2019); Partial vulvectomy (Bilateral, 10/31/2019); Surgical procedure for stress incontinence using tension free vaginal tape (N/A, 10/31/2019); Cystoscopy (N/A, 10/31/2019); and Revision of vaginal cuff (N/A, 12/30/2019).  Medications:   Nicci has a current medication list which includes the following prescription(s): baclofen, cholecalciferol (vitamin d3), nuedexta, estradiol, fluoxetine, gabapentin, ibuprofen, pantoprazole, pravastatin, and tiglutik.  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  Job description includes: n/a  Driving status:  no longer driving     Falls: 0  Near falls: 0    Pain:  Current 0/10    Pt's goals: remain as indep as possible     OBJECTIVE   Patient presents to clinic: walks     Cognitive Exam:  Oriented: Person, Place, Time, and Situation  Behaviors: normal, cooperative  Follows Commands/attention: Follows multistep  commands    Dominant hand:  right     Posture Alignment: [] forward head  [] forward/rounded shoulders  [] head drop   [] increased thoracic kyphosis  [] sacral sitting  [x] unremarkable    Joint integrity: Firm end feeling  Skin integrity: warm/dry    Tone:  normal    Edema: [x] none  [] pitting [] non-pitting      Coordination:   - Fine motor: intact , mild delay on left hand   - UE coordination: intact    Gross motor coordination:   Rapid Alternating Movement: slowed for left hand     Fine motor coordination: 9 hole peg test     Right  Left     2/12/2025  23.46 s   34.91 s     5/7/2025  25 s  33 s   Note: still playing piano but notes   Deferred: still playing the piano does report an increase drag and coordination decline on left     Sensation: reports normal sensation      Joint evaluation: AROM for bilateral upper extremities WFL    Fist: WFL     Strength 2/12/25 2/12/25 5/7/25 5/7/25    Right Left Right  Left    Shoulder flex 5/5 4/5 4/5 3+/5   Shoulder abd 5/5 4/5 4 3+   Shoulder ER 5/5 4+/5 4+ 4   Shoulder IR 5/5 5/5 4+ 4   Shoulder Extension 5/5 4/5 4+ 4   Elbow flex 5/5 5/5 5 4+   Elbow ext 4+/5 4+/5 5 4+   Wrist flex 5/5 4+/5 5 4+   Wrist ext 5/5 4+/5 5 4+     Sitting balance static: good  Sitting balance dynamic: good  Standing balance static: good  Standing balance dynamic: good    ADL's:   Feeding: Puree foods - able to blend via hand held   Grooming: Mod I, electric tooth brush  Hygiene: Mod I  UB Dressing: Mod I buttons are more difficult , able to complete x1 on shirt  LB Dressing: Mod I, slip on sneakers   Toileting: Mod I  Bathing: Mod I - standing to shower     IADL's:   Homecare: Mod  I  Cooking: Mod I with small meals -- not cooking too much  Laundry: Mod I  Yard work: not a previous role  Telephone/technology use:  Mod I  Money management: Mod I  Medication management: Mod I    Treatment      No Treatment Provided today.     Education Provided     Education provided re: POC; Purpose of OT services now and in the future to assist with maximizing independence, adaptation/modifications, and the ALS disease process. Energy conservation     Nicci verbalized good understanding of education provided. Pt has no cultural, educational or language barriers to learning provided.    Written Home Exercises Provided: none provided today    ASSESSMENT    Nicci is a 70 y.o. female referred to outpatient Occupational Therapy with a medical diagnosis of ALS. Pt presents with ALS. She remains Mod I with all ADL/IADLs at this time. She describes an increase in fine motor coordination deficits for her left hand but is not impacting her daily routines and she is able to continue to play piano. No formal OT needs at this time.       Equipment recommendations:   - Install grab bar (daughter reported her dad can complete)  - Button hook -- see pt instructions for [employer]      Referral recommendations: none        Pt prognosis is Good.     Plan of care discussed with patient: Yes  Pt's spiritual, cultural and educational needs considered and patient is agreeable to the plan of care.    Anticipated Barriers for therapy: Progressive nature of ALS     Medical Necessity is demonstrated by the following  Occupational Profile/History  Co-morbidities and personal factors that may impact the plan of care [x] LOW: Brief chart review  [] MODERATE: Expanded chart review   [] HIGH: Extensive chart review    Moderate / High Support Documentation:      Examination  Performance deficits relating to physical, cognitive or psychosocial skills that result in activity limitations and/or participation restrictions  [x] LOW: addressing 1-3  Performance deficits  [] MODERATE: 3-5 Performance deficits  [] HIGH: 5+ Performance deficits (please support below)    Moderate / High Support Documentation:    Physical:  Fine Motor Coordination  Pain  Speech/communication deficits     Cognitive:  No Deficits    Psychosocial:    No Deficits     Treatment Options [x] LOW: Limited options  [] MODERATE: Several options  [] HIGH: Multiple options      Decision Making/ Complexity Score: low       Plan   Plan of care Certification: 5/7/2025 Evaluation Only    Pt to continue to follow up with referring provider and ALS clinic at physician recommended intervals.       Cristina Kelly, OT